# Patient Record
Sex: MALE | Race: WHITE | NOT HISPANIC OR LATINO
[De-identification: names, ages, dates, MRNs, and addresses within clinical notes are randomized per-mention and may not be internally consistent; named-entity substitution may affect disease eponyms.]

---

## 2017-02-08 ENCOUNTER — APPOINTMENT (OUTPATIENT)
Dept: ELECTROPHYSIOLOGY | Facility: CLINIC | Age: 72
End: 2017-02-08

## 2017-02-08 ENCOUNTER — NON-APPOINTMENT (OUTPATIENT)
Age: 72
End: 2017-02-08

## 2017-02-08 ENCOUNTER — APPOINTMENT (OUTPATIENT)
Dept: CARDIOLOGY | Facility: CLINIC | Age: 72
End: 2017-02-08

## 2017-02-08 VITALS
WEIGHT: 198 LBS | SYSTOLIC BLOOD PRESSURE: 147 MMHG | HEART RATE: 83 BPM | DIASTOLIC BLOOD PRESSURE: 84 MMHG | RESPIRATION RATE: 12 BRPM | HEIGHT: 69 IN | OXYGEN SATURATION: 97 % | BODY MASS INDEX: 29.33 KG/M2

## 2017-02-08 VITALS — OXYGEN SATURATION: 98 % | SYSTOLIC BLOOD PRESSURE: 130 MMHG | HEART RATE: 64 BPM | DIASTOLIC BLOOD PRESSURE: 79 MMHG

## 2017-02-08 VITALS — SYSTOLIC BLOOD PRESSURE: 152 MMHG | HEART RATE: 64 BPM | DIASTOLIC BLOOD PRESSURE: 89 MMHG | OXYGEN SATURATION: 97 %

## 2017-02-08 VITALS — HEART RATE: 63 BPM | DIASTOLIC BLOOD PRESSURE: 84 MMHG | OXYGEN SATURATION: 98 % | SYSTOLIC BLOOD PRESSURE: 139 MMHG

## 2017-02-08 VITALS — DIASTOLIC BLOOD PRESSURE: 84 MMHG | SYSTOLIC BLOOD PRESSURE: 149 MMHG | HEART RATE: 73 BPM | OXYGEN SATURATION: 98 %

## 2017-02-10 LAB
25(OH)D3 SERPL-MCNC: 33.7 NG/ML
ALBUMIN SERPL ELPH-MCNC: 4 G/DL
ALP BLD-CCNC: 113 U/L
ALT SERPL-CCNC: 12 U/L
ANION GAP SERPL CALC-SCNC: 17 MMOL/L
APPEARANCE: CLEAR
AST SERPL-CCNC: 15 U/L
BACTERIA: NEGATIVE
BASOPHILS # BLD AUTO: 0.02 K/UL
BASOPHILS NFR BLD AUTO: 0.4 %
BILIRUB SERPL-MCNC: 1 MG/DL
BILIRUBIN URINE: NEGATIVE
BLOOD URINE: NEGATIVE
BUN SERPL-MCNC: 19 MG/DL
CALCIUM SERPL-MCNC: 9.5 MG/DL
CHLORIDE SERPL-SCNC: 101 MMOL/L
CHOLEST SERPL-MCNC: 177 MG/DL
CHOLEST/HDLC SERPL: 4.5 RATIO
CK SERPL-CCNC: 77 U/L
CO2 SERPL-SCNC: 23 MMOL/L
COLOR: YELLOW
CREAT SERPL-MCNC: 0.92 MG/DL
CREAT SPEC-SCNC: 40 MG/DL
EOSINOPHIL # BLD AUTO: 0.1 K/UL
EOSINOPHIL NFR BLD AUTO: 1.8 %
GLUCOSE QUALITATIVE U: NORMAL MG/DL
GLUCOSE SERPL-MCNC: 55 MG/DL
HBA1C MFR BLD HPLC: 5.5 %
HCT VFR BLD CALC: 48.5 %
HDLC SERPL-MCNC: 39 MG/DL
HGB BLD-MCNC: 16.3 G/DL
IMM GRANULOCYTES NFR BLD AUTO: 0.2 %
INR PPP: 1.9 RATIO
KETONES URINE: NEGATIVE
LDLC SERPL CALC-MCNC: 113 MG/DL
LEUKOCYTE ESTERASE URINE: NEGATIVE
LYMPHOCYTES # BLD AUTO: 1.61 K/UL
LYMPHOCYTES NFR BLD AUTO: 29.5 %
MAGNESIUM SERPL-MCNC: 2 MG/DL
MAN DIFF?: NORMAL
MCHC RBC-ENTMCNC: 30.2 PG
MCHC RBC-ENTMCNC: 33.6 GM/DL
MCV RBC AUTO: 89.8 FL
MICROALBUMIN 24H UR DL<=1MG/L-MCNC: 1.2 MG/DL
MICROALBUMIN/CREAT 24H UR-RTO: 30 UG/MG
MICROSCOPIC-UA: NORMAL
MONOCYTES # BLD AUTO: 0.33 K/UL
MONOCYTES NFR BLD AUTO: 6 %
NEUTROPHILS # BLD AUTO: 3.39 K/UL
NEUTROPHILS NFR BLD AUTO: 62.1 %
NITRITE URINE: NEGATIVE
NT-PROBNP SERPL-MCNC: 1273 PG/ML
PH URINE: 5.5
PLATELET # BLD AUTO: 168 K/UL
POTASSIUM SERPL-SCNC: 3.8 MMOL/L
PROT SERPL-MCNC: 7.2 G/DL
PROTEIN URINE: NEGATIVE MG/DL
PT BLD: 21.6 SEC
RBC # BLD: 5.4 M/UL
RBC # FLD: 13.9 %
RED BLOOD CELLS URINE: 0 /HPF
SODIUM SERPL-SCNC: 141 MMOL/L
SPECIFIC GRAVITY URINE: 1.01
SQUAMOUS EPITHELIAL CELLS: 0 /HPF
T4 FREE SERPL-MCNC: 1.2 NG/DL
TRIGL SERPL-MCNC: 124 MG/DL
UROBILINOGEN URINE: NORMAL MG/DL
WBC # FLD AUTO: 5.46 K/UL
WHITE BLOOD CELLS URINE: 0 /HPF

## 2017-05-10 ENCOUNTER — APPOINTMENT (OUTPATIENT)
Dept: ELECTROPHYSIOLOGY | Facility: CLINIC | Age: 72
End: 2017-05-10

## 2017-08-11 ENCOUNTER — APPOINTMENT (OUTPATIENT)
Dept: CARDIOLOGY | Facility: CLINIC | Age: 72
End: 2017-08-11
Payer: MEDICARE

## 2017-08-11 ENCOUNTER — NON-APPOINTMENT (OUTPATIENT)
Age: 72
End: 2017-08-11

## 2017-08-11 VITALS
HEART RATE: 64 BPM | WEIGHT: 194 LBS | DIASTOLIC BLOOD PRESSURE: 83 MMHG | HEIGHT: 67 IN | OXYGEN SATURATION: 100 % | RESPIRATION RATE: 12 BRPM | SYSTOLIC BLOOD PRESSURE: 141 MMHG | BODY MASS INDEX: 30.45 KG/M2

## 2017-08-11 VITALS — HEART RATE: 62 BPM | SYSTOLIC BLOOD PRESSURE: 149 MMHG | DIASTOLIC BLOOD PRESSURE: 83 MMHG | OXYGEN SATURATION: 100 %

## 2017-08-11 VITALS — OXYGEN SATURATION: 100 % | HEART RATE: 63 BPM | SYSTOLIC BLOOD PRESSURE: 147 MMHG | DIASTOLIC BLOOD PRESSURE: 93 MMHG

## 2017-08-11 PROCEDURE — 93000 ELECTROCARDIOGRAM COMPLETE: CPT

## 2017-08-11 PROCEDURE — 99215 OFFICE O/P EST HI 40 MIN: CPT

## 2017-08-11 RX ORDER — CLOTRIMAZOLE 10 MG/1
10 LOZENGE ORAL
Qty: 30 | Refills: 0 | Status: COMPLETED | COMMUNITY
Start: 2016-10-19

## 2017-08-11 RX ORDER — NEOMYCIN SULFATE, POLYMYXIN B SULFATE, HYDROCORTISONE 3.5; 10000; 1 MG/ML; [USP'U]/ML; MG/ML
1 SOLUTION/ DROPS AURICULAR (OTIC)
Qty: 10 | Refills: 0 | Status: COMPLETED | COMMUNITY
Start: 2016-12-23

## 2017-08-11 RX ORDER — WARFARIN 2 MG/1
2 TABLET ORAL
Qty: 30 | Refills: 0 | Status: DISCONTINUED | COMMUNITY
Start: 2016-09-15 | End: 2017-08-11

## 2017-08-11 RX ORDER — AMOXICILLIN AND CLAVULANATE POTASSIUM 875; 125 MG/1; MG/1
875-125 TABLET, COATED ORAL
Qty: 10 | Refills: 0 | Status: COMPLETED | COMMUNITY
Start: 2016-10-04

## 2017-08-11 RX ORDER — SODIUM SULFATE, POTASSIUM SULFATE, MAGNESIUM SULFATE 17.5; 3.13; 1.6 G/ML; G/ML; G/ML
17.5-3.13-1.6 SOLUTION, CONCENTRATE ORAL
Qty: 354 | Refills: 0 | Status: COMPLETED | COMMUNITY
Start: 2016-08-11

## 2017-08-11 RX ORDER — DOXYCYCLINE 100 MG/1
100 CAPSULE ORAL
Qty: 10 | Refills: 0 | Status: COMPLETED | COMMUNITY
Start: 2016-10-04

## 2017-08-11 RX ORDER — WARFARIN 2.5 MG/1
2.5 TABLET ORAL
Qty: 30 | Refills: 0 | Status: DISCONTINUED | COMMUNITY
Start: 2016-09-15 | End: 2017-08-11

## 2017-08-11 RX ORDER — MECLIZINE HYDROCHLORIDE 25 MG/1
25 TABLET ORAL
Qty: 1 | Refills: 0 | Status: ACTIVE | COMMUNITY
Start: 2017-04-11

## 2017-08-11 RX ORDER — HYDROCORTISONE 2.5% 25 MG/G
2.5 CREAM TOPICAL
Qty: 30 | Refills: 0 | Status: COMPLETED | COMMUNITY
Start: 2016-05-12

## 2017-08-14 ENCOUNTER — APPOINTMENT (OUTPATIENT)
Dept: ELECTROPHYSIOLOGY | Facility: CLINIC | Age: 72
End: 2017-08-14

## 2017-08-14 LAB
25(OH)D3 SERPL-MCNC: 38.6 NG/ML
ALBUMIN SERPL ELPH-MCNC: 4.3 G/DL
ALP BLD-CCNC: 117 U/L
ALT SERPL-CCNC: 19 U/L
ANION GAP SERPL CALC-SCNC: 17 MMOL/L
APPEARANCE: CLEAR
AST SERPL-CCNC: 21 U/L
BACTERIA: NEGATIVE
BASOPHILS # BLD AUTO: 0.03 K/UL
BASOPHILS NFR BLD AUTO: 0.6 %
BILIRUB SERPL-MCNC: 1.3 MG/DL
BILIRUBIN URINE: NEGATIVE
BLOOD URINE: NEGATIVE
BUN SERPL-MCNC: 14 MG/DL
CALCIUM SERPL-MCNC: 9.2 MG/DL
CHLORIDE SERPL-SCNC: 102 MMOL/L
CHOLEST SERPL-MCNC: 163 MG/DL
CHOLEST/HDLC SERPL: 4.1 RATIO
CO2 SERPL-SCNC: 24 MMOL/L
COLOR: YELLOW
CREAT SERPL-MCNC: 0.94 MG/DL
CREAT SPEC-SCNC: 40 MG/DL
EOSINOPHIL # BLD AUTO: 0.12 K/UL
EOSINOPHIL NFR BLD AUTO: 2.5 %
GLUCOSE QUALITATIVE U: NORMAL MG/DL
GLUCOSE SERPL-MCNC: 78 MG/DL
HBA1C MFR BLD HPLC: 5.4 %
HCT VFR BLD CALC: 48.8 %
HDLC SERPL-MCNC: 40 MG/DL
HGB BLD-MCNC: 16.2 G/DL
IMM GRANULOCYTES NFR BLD AUTO: 0 %
INR PPP: 2.48 RATIO
KETONES URINE: NEGATIVE
LDLC SERPL CALC-MCNC: 102 MG/DL
LEUKOCYTE ESTERASE URINE: NEGATIVE
LYMPHOCYTES # BLD AUTO: 1.67 K/UL
LYMPHOCYTES NFR BLD AUTO: 35.1 %
MAGNESIUM SERPL-MCNC: 2.3 MG/DL
MAN DIFF?: NORMAL
MCHC RBC-ENTMCNC: 30.3 PG
MCHC RBC-ENTMCNC: 33.2 GM/DL
MCV RBC AUTO: 91.2 FL
MICROALBUMIN 24H UR DL<=1MG/L-MCNC: 1.1 MG/DL
MICROALBUMIN/CREAT 24H UR-RTO: 28 MG/G
MICROSCOPIC-UA: NORMAL
MONOCYTES # BLD AUTO: 0.33 K/UL
MONOCYTES NFR BLD AUTO: 6.9 %
NEUTROPHILS # BLD AUTO: 2.61 K/UL
NEUTROPHILS NFR BLD AUTO: 54.9 %
NITRITE URINE: NEGATIVE
NT-PROBNP SERPL-MCNC: 1001 PG/ML
OSMOLALITY SERPL: 292 MOS/KG
PH URINE: 7.5
PLATELET # BLD AUTO: 145 K/UL
POTASSIUM SERPL-SCNC: 4.5 MMOL/L
PROT SERPL-MCNC: 7.5 G/DL
PROTEIN URINE: NEGATIVE MG/DL
PT BLD: 28.5 SEC
RBC # BLD: 5.35 M/UL
RBC # FLD: 14 %
RED BLOOD CELLS URINE: 1 /HPF
SODIUM SERPL-SCNC: 143 MMOL/L
SPECIFIC GRAVITY URINE: 1.01
SQUAMOUS EPITHELIAL CELLS: 0 /HPF
TRIGL SERPL-MCNC: 107 MG/DL
UROBILINOGEN URINE: NORMAL MG/DL
WBC # FLD AUTO: 4.76 K/UL
WHITE BLOOD CELLS URINE: 0 /HPF

## 2017-11-20 ENCOUNTER — APPOINTMENT (OUTPATIENT)
Dept: ELECTROPHYSIOLOGY | Facility: CLINIC | Age: 72
End: 2017-11-20

## 2018-02-28 ENCOUNTER — APPOINTMENT (OUTPATIENT)
Dept: ELECTROPHYSIOLOGY | Facility: CLINIC | Age: 73
End: 2018-02-28
Payer: MEDICARE

## 2018-02-28 ENCOUNTER — APPOINTMENT (OUTPATIENT)
Dept: CARDIOLOGY | Facility: CLINIC | Age: 73
End: 2018-02-28
Payer: MEDICARE

## 2018-02-28 VITALS
HEIGHT: 67 IN | SYSTOLIC BLOOD PRESSURE: 115 MMHG | BODY MASS INDEX: 29.82 KG/M2 | DIASTOLIC BLOOD PRESSURE: 69 MMHG | OXYGEN SATURATION: 99 % | HEART RATE: 73 BPM | RESPIRATION RATE: 12 BRPM | WEIGHT: 190 LBS

## 2018-02-28 VITALS — HEART RATE: 60 BPM | SYSTOLIC BLOOD PRESSURE: 130 MMHG | DIASTOLIC BLOOD PRESSURE: 74 MMHG

## 2018-02-28 PROCEDURE — 36415 COLL VENOUS BLD VENIPUNCTURE: CPT

## 2018-02-28 PROCEDURE — 93000 ELECTROCARDIOGRAM COMPLETE: CPT

## 2018-02-28 PROCEDURE — 99215 OFFICE O/P EST HI 40 MIN: CPT

## 2018-02-28 PROCEDURE — 93040 RHYTHM ECG WITH REPORT: CPT | Mod: 59

## 2018-02-28 PROCEDURE — 93279 PRGRMG DEV EVAL PM/LDLS PM: CPT

## 2018-03-01 LAB
25(OH)D3 SERPL-MCNC: 40.7 NG/ML
ALBUMIN SERPL ELPH-MCNC: 3.9 G/DL
ALP BLD-CCNC: 114 U/L
ALT SERPL-CCNC: 22 U/L
ANION GAP SERPL CALC-SCNC: 24 MMOL/L
APPEARANCE: CLEAR
AST SERPL-CCNC: 30 U/L
BACTERIA: NEGATIVE
BASOPHILS # BLD AUTO: 0.04 K/UL
BASOPHILS NFR BLD AUTO: 0.8 %
BILIRUB SERPL-MCNC: 1.1 MG/DL
BILIRUBIN URINE: NEGATIVE
BLOOD URINE: NEGATIVE
BUN SERPL-MCNC: 20 MG/DL
CALCIUM SERPL-MCNC: 9.7 MG/DL
CHLORIDE SERPL-SCNC: 102 MMOL/L
CHOLEST SERPL-MCNC: 160 MG/DL
CHOLEST/HDLC SERPL: 3.4 RATIO
CO2 SERPL-SCNC: 19 MMOL/L
COLOR: YELLOW
CREAT SERPL-MCNC: 1.15 MG/DL
CREAT SPEC-SCNC: 58 MG/DL
EOSINOPHIL # BLD AUTO: 0.16 K/UL
EOSINOPHIL NFR BLD AUTO: 3.3 %
GLUCOSE QUALITATIVE U: NEGATIVE MG/DL
GLUCOSE SERPL-MCNC: 52 MG/DL
HBA1C MFR BLD HPLC: 5.6 %
HCT VFR BLD CALC: 48.3 %
HDLC SERPL-MCNC: 47 MG/DL
HGB BLD-MCNC: 16 G/DL
IMM GRANULOCYTES NFR BLD AUTO: 0.2 %
INR PPP: 2.52 RATIO
KETONES URINE: NEGATIVE
LDLC SERPL CALC-MCNC: 98 MG/DL
LEUKOCYTE ESTERASE URINE: NEGATIVE
LYMPHOCYTES # BLD AUTO: 1.55 K/UL
LYMPHOCYTES NFR BLD AUTO: 32 %
MAGNESIUM SERPL-MCNC: 2.2 MG/DL
MAN DIFF?: NORMAL
MCHC RBC-ENTMCNC: 30.7 PG
MCHC RBC-ENTMCNC: 33.1 GM/DL
MCV RBC AUTO: 92.7 FL
MICROALBUMIN 24H UR DL<=1MG/L-MCNC: 0.4 MG/DL
MICROALBUMIN/CREAT 24H UR-RTO: 7 MG/G
MICROSCOPIC-UA: NORMAL
MONOCYTES # BLD AUTO: 0.26 K/UL
MONOCYTES NFR BLD AUTO: 5.4 %
NEUTROPHILS # BLD AUTO: 2.83 K/UL
NEUTROPHILS NFR BLD AUTO: 58.3 %
NITRITE URINE: NEGATIVE
NT-PROBNP SERPL-MCNC: 1439 PG/ML
PH URINE: 7
PLATELET # BLD AUTO: 153 K/UL
POTASSIUM SERPL-SCNC: 4.3 MMOL/L
PROT SERPL-MCNC: 7.3 G/DL
PROTEIN URINE: NEGATIVE MG/DL
PT BLD: 29 SEC
RBC # BLD: 5.21 M/UL
RBC # FLD: 14.6 %
RED BLOOD CELLS URINE: 1 /HPF
SODIUM SERPL-SCNC: 145 MMOL/L
SPECIFIC GRAVITY URINE: 1.01
SQUAMOUS EPITHELIAL CELLS: 0 /HPF
T4 FREE SERPL-MCNC: 1.3 NG/DL
TRIGL SERPL-MCNC: 75 MG/DL
TSH SERPL-ACNC: 1.75 UIU/ML
UROBILINOGEN URINE: 1 MG/DL
WBC # FLD AUTO: 4.85 K/UL
WHITE BLOOD CELLS URINE: 0 /HPF

## 2018-06-04 ENCOUNTER — APPOINTMENT (OUTPATIENT)
Dept: ELECTROPHYSIOLOGY | Facility: CLINIC | Age: 73
End: 2018-06-04

## 2018-06-05 ENCOUNTER — APPOINTMENT (OUTPATIENT)
Dept: ELECTROPHYSIOLOGY | Facility: CLINIC | Age: 73
End: 2018-06-05
Payer: MEDICARE

## 2018-06-05 PROCEDURE — 93279 PRGRMG DEV EVAL PM/LDLS PM: CPT

## 2018-08-03 ENCOUNTER — RX RENEWAL (OUTPATIENT)
Age: 73
End: 2018-08-03

## 2018-09-03 ENCOUNTER — RESULT CHARGE (OUTPATIENT)
Age: 73
End: 2018-09-03

## 2018-09-05 ENCOUNTER — APPOINTMENT (OUTPATIENT)
Dept: CARDIOLOGY | Facility: CLINIC | Age: 73
End: 2018-09-05
Payer: MEDICARE

## 2018-09-05 ENCOUNTER — APPOINTMENT (OUTPATIENT)
Dept: ELECTROPHYSIOLOGY | Facility: CLINIC | Age: 73
End: 2018-09-05
Payer: MEDICARE

## 2018-09-05 ENCOUNTER — NON-APPOINTMENT (OUTPATIENT)
Age: 73
End: 2018-09-05

## 2018-09-05 VITALS
WEIGHT: 187 LBS | HEART RATE: 64 BPM | OXYGEN SATURATION: 98 % | BODY MASS INDEX: 29.35 KG/M2 | SYSTOLIC BLOOD PRESSURE: 130 MMHG | HEIGHT: 67 IN | RESPIRATION RATE: 12 BRPM | DIASTOLIC BLOOD PRESSURE: 75 MMHG

## 2018-09-05 VITALS — HEART RATE: 63 BPM | DIASTOLIC BLOOD PRESSURE: 77 MMHG | SYSTOLIC BLOOD PRESSURE: 130 MMHG | OXYGEN SATURATION: 98 %

## 2018-09-05 VITALS — HEART RATE: 62 BPM | SYSTOLIC BLOOD PRESSURE: 129 MMHG | DIASTOLIC BLOOD PRESSURE: 71 MMHG | OXYGEN SATURATION: 98 %

## 2018-09-05 VITALS — DIASTOLIC BLOOD PRESSURE: 73 MMHG | SYSTOLIC BLOOD PRESSURE: 120 MMHG | OXYGEN SATURATION: 98 % | HEART RATE: 62 BPM

## 2018-09-05 PROCEDURE — 93040 RHYTHM ECG WITH REPORT: CPT | Mod: 59

## 2018-09-05 PROCEDURE — 93000 ELECTROCARDIOGRAM COMPLETE: CPT

## 2018-09-05 PROCEDURE — 99215 OFFICE O/P EST HI 40 MIN: CPT

## 2018-09-05 PROCEDURE — 93279 PRGRMG DEV EVAL PM/LDLS PM: CPT

## 2018-09-05 RX ORDER — WARFARIN 5 MG/1
5 TABLET ORAL
Qty: 90 | Refills: 3 | Status: DISCONTINUED | COMMUNITY
Start: 2017-08-11 | End: 2018-09-05

## 2018-10-05 ENCOUNTER — APPOINTMENT (OUTPATIENT)
Dept: ELECTROPHYSIOLOGY | Facility: CLINIC | Age: 73
End: 2018-10-05
Payer: MEDICARE

## 2018-10-05 VITALS — OXYGEN SATURATION: 97 % | WEIGHT: 187 LBS | BODY MASS INDEX: 29.35 KG/M2 | HEIGHT: 67 IN

## 2018-10-05 VITALS — HEART RATE: 60 BPM | SYSTOLIC BLOOD PRESSURE: 141 MMHG | RESPIRATION RATE: 14 BRPM | DIASTOLIC BLOOD PRESSURE: 81 MMHG

## 2018-10-05 PROCEDURE — 93279 PRGRMG DEV EVAL PM/LDLS PM: CPT

## 2018-10-05 PROCEDURE — 93000 ELECTROCARDIOGRAM COMPLETE: CPT | Mod: 59

## 2018-10-05 PROCEDURE — 99214 OFFICE O/P EST MOD 30 MIN: CPT

## 2018-10-05 RX ORDER — GABAPENTIN 300 MG/1
300 CAPSULE ORAL
Qty: 30 | Refills: 0 | Status: DISCONTINUED | COMMUNITY
Start: 2016-12-15 | End: 2018-10-05

## 2018-10-05 RX ORDER — BENZONATATE 200 MG/1
200 CAPSULE ORAL
Qty: 21 | Refills: 0 | Status: DISCONTINUED | COMMUNITY
Start: 2016-12-23 | End: 2018-10-05

## 2018-10-05 RX ORDER — ICOSAPENT ETHYL 1000 MG/1
1 CAPSULE ORAL
Qty: 60 | Refills: 0 | Status: DISCONTINUED | COMMUNITY
Start: 2016-09-15 | End: 2018-10-05

## 2018-10-06 ENCOUNTER — NON-APPOINTMENT (OUTPATIENT)
Age: 73
End: 2018-10-06

## 2018-10-24 ENCOUNTER — APPOINTMENT (OUTPATIENT)
Dept: CV DIAGNOSITCS | Facility: HOSPITAL | Age: 73
End: 2018-10-24

## 2018-11-06 ENCOUNTER — APPOINTMENT (OUTPATIENT)
Dept: ELECTROPHYSIOLOGY | Facility: CLINIC | Age: 73
End: 2018-11-06

## 2018-11-14 ENCOUNTER — APPOINTMENT (OUTPATIENT)
Dept: ELECTROPHYSIOLOGY | Facility: CLINIC | Age: 73
End: 2018-11-14

## 2018-11-15 ENCOUNTER — APPOINTMENT (OUTPATIENT)
Dept: CV DIAGNOSITCS | Facility: HOSPITAL | Age: 73
End: 2018-11-15
Payer: MEDICARE

## 2018-11-15 ENCOUNTER — OUTPATIENT (OUTPATIENT)
Dept: OUTPATIENT SERVICES | Facility: HOSPITAL | Age: 73
LOS: 1 days | End: 2018-11-15

## 2018-11-15 DIAGNOSIS — R53.83 OTHER FATIGUE: ICD-10-CM

## 2018-11-15 DIAGNOSIS — I48.1 PERSISTENT ATRIAL FIBRILLATION: ICD-10-CM

## 2018-11-15 PROCEDURE — 93306 TTE W/DOPPLER COMPLETE: CPT | Mod: 26

## 2018-12-14 ENCOUNTER — APPOINTMENT (OUTPATIENT)
Dept: ELECTROPHYSIOLOGY | Facility: CLINIC | Age: 73
End: 2018-12-14
Payer: MEDICARE

## 2018-12-14 PROCEDURE — 93288 INTERROG EVL PM/LDLS PM IP: CPT

## 2019-02-05 ENCOUNTER — APPOINTMENT (OUTPATIENT)
Dept: ELECTROPHYSIOLOGY | Facility: CLINIC | Age: 74
End: 2019-02-05
Payer: MEDICARE

## 2019-02-05 VITALS
OXYGEN SATURATION: 98 % | BODY MASS INDEX: 29.82 KG/M2 | WEIGHT: 190 LBS | HEART RATE: 65 BPM | SYSTOLIC BLOOD PRESSURE: 150 MMHG | DIASTOLIC BLOOD PRESSURE: 87 MMHG | HEIGHT: 67 IN

## 2019-02-05 PROCEDURE — 93279 PRGRMG DEV EVAL PM/LDLS PM: CPT

## 2019-02-05 PROCEDURE — 99215 OFFICE O/P EST HI 40 MIN: CPT

## 2019-02-05 PROCEDURE — 93000 ELECTROCARDIOGRAM COMPLETE: CPT | Mod: 59

## 2019-02-05 NOTE — DISCUSSION/SUMMARY
[FreeTextEntry1] : In summary, Remi Mccoy is a 72y/o man with Hx of HTN, HLD, BPH, vertigo, permanent atrial fibrillation, maintained on Eliquis 5mg BID, and sick sinus syndrome s/p single chamber PPM who presents today for routine f/u as device has reached RYAN. Admits doing well but does note feeling increased fatigue. Exercises as tolerated. Denies chest pain, palpitations, SOB, syncope or near syncope. Device reached RYAN on 1/11/2019. Recent Echo 11/2018 with LVEF 63%. Recommend undergoing routine pacemaker gen change. Risks, benefits, and alternatives to procedure discussed and patient agrees to proceed. \par \par Sincerely,\par \par Vin Pierce MD

## 2019-02-05 NOTE — HISTORY OF PRESENT ILLNESS
[FreeTextEntry1] : Navjot Mccollum MD\par \par I saw Remi Mccoy on February 5, 2019. As you know, he is a 74y/o man with Hx of HTN, HLD, BPH, vertigo, permanent atrial fibrillation, maintained on Eliquis 5mg BID, and sick sinus syndrome s/p single chamber PPM who presents today for routine f/u as device has reached RYAN. Admits doing well but does note feeling increased fatigue. Exercises as tolerated. Denies chest pain, palpitations, SOB, syncope or near syncope. \par

## 2019-02-05 NOTE — PHYSICAL EXAM
[General Appearance - Well Developed] : well developed [Normal Appearance] : normal appearance [Well Groomed] : well groomed [General Appearance - Well Nourished] : well nourished [No Deformities] : no deformities [General Appearance - In No Acute Distress] : no acute distress [Normal Conjunctiva] : the conjunctiva exhibited no abnormalities [Eyelids - No Xanthelasma] : the eyelids demonstrated no xanthelasmas [Normal Oral Mucosa] : normal oral mucosa [No Oral Pallor] : no oral pallor [No Oral Cyanosis] : no oral cyanosis [Normal Jugular Venous A Waves Present] : normal jugular venous A waves present [Normal Jugular Venous V Waves Present] : normal jugular venous V waves present [No Jugular Venous Burgos A Waves] : no jugular venous burgos A waves [Respiration, Rhythm And Depth] : normal respiratory rhythm and effort [Exaggerated Use Of Accessory Muscles For Inspiration] : no accessory muscle use [Auscultation Breath Sounds / Voice Sounds] : lungs were clear to auscultation bilaterally [Heart Rate And Rhythm] : heart rate and rhythm were normal [Heart Sounds] : normal S1 and S2 [Murmurs] : no murmurs present [Abdomen Soft] : soft [Abdomen Tenderness] : non-tender [Abdomen Mass (___ Cm)] : no abdominal mass palpated [Abnormal Walk] : normal gait [Gait - Sufficient For Exercise Testing] : the gait was sufficient for exercise testing [Nail Clubbing] : no clubbing of the fingernails [Cyanosis, Localized] : no localized cyanosis [Petechial Hemorrhages (___cm)] : no petechial hemorrhages [Skin Color & Pigmentation] : normal skin color and pigmentation [] : no rash [No Venous Stasis] : no venous stasis [Skin Lesions] : no skin lesions [No Skin Ulcers] : no skin ulcer [No Xanthoma] : no  xanthoma was observed [Oriented To Time, Place, And Person] : oriented to person, place, and time [Affect] : the affect was normal [Mood] : the mood was normal [No Anxiety] : not feeling anxious

## 2019-03-05 ENCOUNTER — RESULT REVIEW (OUTPATIENT)
Age: 74
End: 2019-03-05

## 2019-03-06 ENCOUNTER — NON-APPOINTMENT (OUTPATIENT)
Age: 74
End: 2019-03-06

## 2019-03-06 ENCOUNTER — APPOINTMENT (OUTPATIENT)
Dept: CARDIOLOGY | Facility: CLINIC | Age: 74
End: 2019-03-06
Payer: MEDICARE

## 2019-03-06 VITALS
OXYGEN SATURATION: 98 % | HEIGHT: 67 IN | DIASTOLIC BLOOD PRESSURE: 83 MMHG | WEIGHT: 178 LBS | SYSTOLIC BLOOD PRESSURE: 148 MMHG | BODY MASS INDEX: 27.94 KG/M2 | HEART RATE: 65 BPM | RESPIRATION RATE: 12 BRPM

## 2019-03-06 VITALS — HEART RATE: 65 BPM | DIASTOLIC BLOOD PRESSURE: 85 MMHG | OXYGEN SATURATION: 98 % | SYSTOLIC BLOOD PRESSURE: 152 MMHG

## 2019-03-06 DIAGNOSIS — I48.2 CHRONIC ATRIAL FIBRILLATION: ICD-10-CM

## 2019-03-06 DIAGNOSIS — I48.91 UNSPECIFIED ATRIAL FIBRILLATION: ICD-10-CM

## 2019-03-06 DIAGNOSIS — K80.62: ICD-10-CM

## 2019-03-06 DIAGNOSIS — R53.83 OTHER FATIGUE: ICD-10-CM

## 2019-03-06 PROCEDURE — 99215 OFFICE O/P EST HI 40 MIN: CPT | Mod: 25

## 2019-03-06 PROCEDURE — 93000 ELECTROCARDIOGRAM COMPLETE: CPT

## 2019-03-06 PROCEDURE — 93040 RHYTHM ECG WITH REPORT: CPT | Mod: 59

## 2019-03-06 NOTE — REASON FOR VISIT
[Follow-Up - Clinic] : a clinic follow-up of [Anticoagulation] : anticoagulation [Atrial Fibrillation] : atrial fibrillation [Cardiomyopathy] : cardiomyopathy [Chest Pain] : chest pain [Coronary Artery Disease] : coronary artery disease [Hyperlipidemia] : hyperlipidemia [Pacemaker Evaluation] : pacemaker ~T evaluation ~C was performed

## 2019-03-07 PROBLEM — K80.62 CALCULUS OF GALLBLADDER AND BILE DUCT WITH ACUTE CHOLECYSTITIS WITHOUT OBSTRUCTION: Status: ACTIVE | Noted: 2019-03-07

## 2019-03-07 LAB
25(OH)D3 SERPL-MCNC: 33.5 NG/ML
ALBUMIN SERPL ELPH-MCNC: 4.1 G/DL
ALP BLD-CCNC: 101 U/L
ALT SERPL-CCNC: 22 U/L
ANION GAP SERPL CALC-SCNC: 16 MMOL/L
APPEARANCE: ABNORMAL
AST SERPL-CCNC: 22 U/L
BACTERIA: NEGATIVE
BASOPHILS # BLD AUTO: 0.05 K/UL
BASOPHILS NFR BLD AUTO: 0.9 %
BILIRUB SERPL-MCNC: 0.9 MG/DL
BILIRUBIN URINE: NEGATIVE
BLOOD URINE: NEGATIVE
BUN SERPL-MCNC: 18 MG/DL
CALCIUM OXALATE CRYSTALS: ABNORMAL
CALCIUM SERPL-MCNC: 9.7 MG/DL
CHLORIDE SERPL-SCNC: 102 MMOL/L
CHOLEST SERPL-MCNC: 145 MG/DL
CHOLEST/HDLC SERPL: 3.7 RATIO
CO2 SERPL-SCNC: 26 MMOL/L
COLOR: YELLOW
CREAT SERPL-MCNC: 0.98 MG/DL
CREAT SPEC-SCNC: 209 MG/DL
CRP SERPL HS-MCNC: 2.39 MG/L
EOSINOPHIL # BLD AUTO: 0.18 K/UL
EOSINOPHIL NFR BLD AUTO: 3.3 %
GLUCOSE QUALITATIVE U: NEGATIVE
GLUCOSE SERPL-MCNC: 48 MG/DL
GRANULAR CASTS: 0 /LPF
HBA1C MFR BLD HPLC: 5.4 %
HCT VFR BLD CALC: 52.4 %
HDLC SERPL-MCNC: 39 MG/DL
HGB BLD-MCNC: 17.1 G/DL
HYALINE CASTS: 0 /LPF
IMM GRANULOCYTES NFR BLD AUTO: 0.2 %
KETONES URINE: NEGATIVE
LDLC SERPL CALC-MCNC: 94 MG/DL
LEUKOCYTE ESTERASE URINE: NEGATIVE
LYMPHOCYTES # BLD AUTO: 1.83 K/UL
LYMPHOCYTES NFR BLD AUTO: 33.2 %
MAGNESIUM SERPL-MCNC: 2.1 MG/DL
MAN DIFF?: NORMAL
MCHC RBC-ENTMCNC: 29.9 PG
MCHC RBC-ENTMCNC: 32.6 GM/DL
MCV RBC AUTO: 91.8 FL
MICROALBUMIN 24H UR DL<=1MG/L-MCNC: 5.4 MG/DL
MICROALBUMIN/CREAT 24H UR-RTO: 26 MG/G
MICROSCOPIC-UA: NORMAL
MONOCYTES # BLD AUTO: 0.34 K/UL
MONOCYTES NFR BLD AUTO: 6.2 %
NEUTROPHILS # BLD AUTO: 3.1 K/UL
NEUTROPHILS NFR BLD AUTO: 56.2 %
NITRITE URINE: NEGATIVE
PH URINE: 5.5
PLATELET # BLD AUTO: 144 K/UL
POTASSIUM SERPL-SCNC: 3.6 MMOL/L
PROT SERPL-MCNC: 7.3 G/DL
PROTEIN URINE: NORMAL
RBC # BLD: 5.71 M/UL
RBC # FLD: 13.1 %
RED BLOOD CELLS URINE: 0 /HPF
SODIUM SERPL-SCNC: 144 MMOL/L
SPECIFIC GRAVITY URINE: 1.03
SQUAMOUS EPITHELIAL CELLS: 1 /HPF
T4 FREE SERPL-MCNC: 1.3 NG/DL
TRIGL SERPL-MCNC: 59 MG/DL
TSH SERPL-ACNC: 2.32 UIU/ML
UROBILINOGEN URINE: NORMAL
WBC # FLD AUTO: 5.51 K/UL
WHITE BLOOD CELLS URINE: 1 /HPF

## 2019-03-07 NOTE — ADDENDUM
[FreeTextEntry1] : I spent 60 minutes face to face time with the patient, from 9:00 - 10:00 on 3/6/19. Thirty minutes were devoted to patient counseling as outlined above in the End of Visit section.  Prior to this visit, I review office notes of Dr. Pierce. Labs done 1/31/19 with Dr. Baxter were reviewed. TTE done 11/15/18 was reviewed

## 2019-03-07 NOTE — PHYSICAL EXAM
[General Appearance - Well Developed] : well developed [Normal Appearance] : normal appearance [Well Groomed] : well groomed [General Appearance - Well Nourished] : well nourished [No Deformities] : no deformities [General Appearance - In No Acute Distress] : no acute distress [Normal Conjunctiva] : the conjunctiva exhibited no abnormalities [Eyelids - No Xanthelasma] : the eyelids demonstrated no xanthelasmas [Normal Oral Mucosa] : normal oral mucosa [No Oral Pallor] : no oral pallor [No Oral Cyanosis] : no oral cyanosis [Normal Jugular Venous A Waves Present] : normal jugular venous A waves present [Normal Jugular Venous V Waves Present] : normal jugular venous V waves present [No Jugular Venous Burgos A Waves] : no jugular venous burgos A waves [Respiration, Rhythm And Depth] : normal respiratory rhythm and effort [Exaggerated Use Of Accessory Muscles For Inspiration] : no accessory muscle use [Auscultation Breath Sounds / Voice Sounds] : lungs were clear to auscultation bilaterally [Scattered Wheezes] : scattered wheezing [Abdomen Soft] : soft [Abdomen Tenderness] : non-tender [Abdomen Mass (___ Cm)] : no abdominal mass palpated [Abnormal Walk] : normal gait [Gait - Sufficient For Exercise Testing] : the gait was sufficient for exercise testing [Nail Clubbing] : no clubbing of the fingernails [Cyanosis, Localized] : no localized cyanosis [Petechial Hemorrhages (___cm)] : no petechial hemorrhages [Skin Color & Pigmentation] : normal skin color and pigmentation [] : no rash [No Venous Stasis] : no venous stasis [No Skin Ulcers] : no skin ulcer [No Xanthoma] : no  xanthoma was observed [Smooth] : with smooth borders [Multiple Papules] : multiple [Black] : black [Brown] : brown [Multiple] : multiple [Oriented To Time, Place, And Person] : oriented to person, place, and time [Affect] : the affect was normal [Mood] : the mood was normal [No Anxiety] : not feeling anxious [5th Left ICS - MCL] : palpated at the 5th LICS in the midclavicular line [Diffuse] : diffuse [No Precordial Heave] : no precordial heave was noted [Normal Rate] : normal [Rhythm Regular] : regular [Normal S1] : normal S1 [No Murmur] : no murmurs heard [2+] : left 2+ [No Abnormalities] : the abdominal aorta was not enlarged and no bruit was heard [No Pitting Edema] : no pitting edema present [Rt] : varicose veins of the right leg noted [Lt] : varicose veins of the left leg noted [Prolonged Exp Time] : with normal expiratory time [Increased E/I Ratio] : with normal expiratory/inspiratory ratio  [FreeTextEntry1] : ankle circumference 9.0 inches bilaterally; previously 9.25 in bilaterally [Apical Thrill] : no thrill palpable at the apex [S4] : no S4 [Click] : no click [Pericardial Rub] : no pericardial rub [Right Carotid Bruit] : no bruit heard over the right carotid [Left Carotid Bruit] : no bruit heard over the left carotid [Right Femoral Bruit] : no bruit heard over the right femoral artery [Left Femoral Bruit] : no bruit heard over the left femoral artery

## 2019-03-07 NOTE — DISCUSSION/SUMMARY
[Patient] : the patient [Minutes spent___] : for [unfilled] ~Uminutes [___ Month(s)] : [unfilled] month(s) [With Me] : with me [FreeTextEntry1] : \par WEIGHT GOALS:\par \par Category				BMI range - kg/m2	BMI Prime\par Very severely underweight		less than 15		less than 0.60	\par Severely underweight			from 15.0 to 16.0		from 0.60 to 0.64	\par Underweight				from 16.0 to 18.5		from 0.64 to 0.74	\par Normal (healthy weight)			from 18.5 to 25		from 0.74 to 1.0	\par Overweight				from 25 to 30		from 1.0 to 1.2	\par Obese Class I (Moderately obese)		from 30 to 35		from 1.2 to 1.4	\par Obese Class II (Severely obese)		from 35 to 40		from 1.4 to 1.6	\par Obese Class III (Very severely obese)	over 40	over 1.6				\par \par Your current weight is 178 lbs (190 lbs on 2/5/19; 200 lbs on 2/10/16). Given current weight and height 5'7", your calculated body mass index (BMI) is 27.9 kg/sqm. Normal BMI is 18.5-25 kg/sqm. Thus current weight is in the overweight category. Abdominal waist circumference is measured at the level of the umbilicus and is thus not a pants waist measurement. Your current abdominal waist circumference is 38 inches (40.5  on 9/5/18; 40.5 inches on 2/10/16). Normal abdominal waist circumference is < 32 inches for women and < 37 inches for men.\par \par Small amounts of alcohol may have benefits to the heart and blood pressure. However, excess use of alcohol can cause damage to the brain, liver and other organs. It can lead to high blood pressure. Drinking more than two drinks (15 ml) every day can raise your blood pressure. 15 ml of alcohol equals: \par • one 12-ounce bottle of beer \par • a half glass (5 ounces) of wine \par • 1 ounce (one shot) of 100 proof hard liquor\par \par WAYS TO INCREASE HDL-CHOLESTEROL:\par Lose weight. Extra pounds take a toll on HDL cholesterol. If you're overweight, losing even a few pounds can improve your HDL level. For every 6 pounds (2.7 kilograms) you lose, your HDL may increase by 1 mg/dL (0.03 mmol/L). If you focus on becoming more physically active and choosing healthier foods — two other ways to increase your HDL cholesterol — you'll likely move toward a healthier weight in the process.\par Get more physical activity. Within two months of starting, frequent aerobic exercise can increase HDL cholesterol by about 5 percent in otherwise healthy sedentary adults. Your best bet for increasing HDL cholesterol is to exercise briskly for 30 minutes five times a week. Examples of brisk, aerobic exercise include walking, running, cycling, swimming, playing basketball and raking leaves — anything that increases your heart rate. You can also break up your daily activity into three 10-minute segments if you're having difficulty finding time to exercise.\par Choose healthier fats. A healthy diet includes some fat, but there's a limit. In a heart-healthy diet, between 25 and 35 percent of your total daily calories can come from fat — but saturated fat should account for less than 7 percent of your total daily calories. Avoid foods that contain saturated and trans fats, which raise LDL cholesterol and damage your blood vessels.\par Monounsaturated and polyunsaturated fats — found in olive, peanut and canola oils — tend to improve HDL's anti-inflammatory abilities. Nuts, fish and other foods containing omega-3 fatty acids are other good choices for improving your LDL cholesterol to HDL cholesterol ratio.\par \par Some foods may have a healthy effect on blood cholesterol levels. Some options include:\par \par Whole grains, such as oatmeal, oat bran and whole-wheat products\par Nuts, such as walnuts, almonds and brazil nuts\par Plant sterols such as beta-sitosterol and -sitostanol (typically found in margarine spreads such as Promise Activ or Benecol)\par Omega-3 fatty acids, such as fatty fish, fish oil supplements, flaxseeds and flaxseed oil\par

## 2019-03-07 NOTE — HISTORY OF PRESENT ILLNESS
[FreeTextEntry1] : Mr. Mccoy was initially seen by me 6/26/08 with known single vessel CAD, of D1 branch of LAD, moderate diffuse LV dysfunction, no PCI.  In 2001, he underwent PPM (Medtronic LKO063 VVI mode) for atrial fibrillation with slow ventricular response and pause up to 4 seconds. He was well until 6/1/08, when he presented to the ED at [facility] with chest pain and severe hypertension (/120 mm Hg). He was treated with intravenous nitroglycerin or nitroprusside and was discharged to home after undergoing a stress test.  During office visit on 8/19/11, there was intermittent substernal chest pain (squeezing with some burning) radiating to the left shoulder. There was frequently dizziness and insomnia He stopped going to the gym and felt weak. In June 2012, he was admitted to [facility] with left shoulder pain and /110-117 mm Hg, despite taking all medication. He was treated for hypertension and discharged home after about 12 hours, with no work up for ischemia. There were subsequent symptoms recurrences. He noted significant stress at that time. In August 2012, he used doxazosin only when BP was elevated. Quinapril (Accupril) was stopped and substituted with Diovan. He was no longer taking isosorbide mononitrate (Imdur). He was taking high dose vitamin D supplement. On Thanksgiving day (11/22/12) at 16:30, he developed sudden warmth and dizziness while driving across the [place] [facility]. He opened the window to get more air and made it across the bridge. He then pulled over to the side of the road.  He began retching and vomiting, but did not lose consciousness. There was no abdominal pain or heartburn. He was admitted to [facility], where he stayed for 48 hours. After discharge, and at the time of office visit on 12/24/12, he still noted periods of dizziness and unsteadiness, but no severe nausea. He had occasional one-two ounce whiskey or cognac, but not daily. He was not smoking. At the time of office visit on 12/13/13, he was not smoking and did not have cravings. He was drinking one to two glasses wine or two ounces whisky / cognac, several days per week. He was using quinapril and not Diovan. At the time of office visit on 6/18/14, he noted continued periodic chest pain. There was no dyspnea, palpitations or lightheadedness. 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 He underwent surgical resection 7/9/14. No additional treatment was required. He lost up to twenty pounds after surgery, but subsequently regained weight. There was occasional chest pain while walking, but pain resolved with continued walking. At the time of office  visit on 2/11/15, he was recovering from upper respiratory infection. He was not routinely exercising, but did some physical activity at home.  He noted discomfort from right inguinal hernia and small midline abdominal hernia. He wore a cloth brace to the visit and noted 4/10 discomfort.  At the time of office visit on 8/12/15, his chief complaint was recurrence of mild vertigo. There was no shortness of breath or chest pain. There was no palpitations, lightheadedness or excessive fatigue. At the time of office visit on 2/10/16, his chief complaint was shortness of breath with exertion. He was in the ED 10 days prior with nasal bleeding.  Nasal packing was performed. INR was ~2.0. He was hospitalized for one week in October 2016 for pneumonia. At the time of office visit on 8/11/17, self obtained BP noted frequent SBP readings > 140 mm Hg. During office visit on 9/5/18, there were no new symptoms. He was not routinely exercising. At the time of office visit on 3/6/19, he felt well. He was scheduled for PPM generator change on 3/11/19 for battery RYAN. He was diagnosed with acute cholecystitis due to gallstones and was considering cholecystectomy. He changed diet, which helped decrease abdominal pain.  He did not take antihypertensive medication on the day of this visit.

## 2019-03-08 PROBLEM — I48.2 PERMANENT ATRIAL FIBRILLATION: Noted: 2019-02-05

## 2019-03-11 ENCOUNTER — OUTPATIENT (OUTPATIENT)
Dept: OUTPATIENT SERVICES | Facility: HOSPITAL | Age: 74
LOS: 1 days | Discharge: HOME | End: 2019-03-11
Payer: MEDICARE

## 2019-03-11 PROCEDURE — 93010 ELECTROCARDIOGRAM REPORT: CPT

## 2019-03-11 PROCEDURE — 33227 REMOVE&REPLACE PM GEN SINGL: CPT

## 2019-03-11 RX ORDER — ACETAMINOPHEN 500 MG
2 TABLET ORAL
Qty: 40 | Refills: 0
Start: 2019-03-11 | End: 2019-03-15

## 2019-03-11 RX ORDER — SODIUM CHLORIDE 9 MG/ML
3 INJECTION INTRAMUSCULAR; INTRAVENOUS; SUBCUTANEOUS EVERY 8 HOURS
Qty: 0 | Refills: 0 | Status: DISCONTINUED | OUTPATIENT
Start: 2019-03-11 | End: 2019-03-26

## 2019-03-11 NOTE — H&P CARDIOLOGY - PMH
Atrial Fibrillation    Cardiac Pacemaker    Dyslipidemia    GERD (Gastroesophageal Reflux Disease)    HTN (Hypertension)

## 2019-03-11 NOTE — CHART NOTE - NSCHARTNOTEFT_GEN_A_CORE
Type of Procedure: Single chamber PPM generator change  Licensed independent practitioner: Vin Pierce MD  Assistant: none  Description of procedure: sterile conditions, antibiotic coverage, old pacemaker removed, prepectoral pocket copiously irrigated with antibiotic solution, new pacemaker implanted and closed in 3 layers  Findings of procedure: normal pacing, sensing and lead integrity  Estimated blood loss: < 10 cc  Specimen removed: none  Preoperative Dx: AV block  Postoperative Dx: AV block  Complications: none  Anesthesia type: local with sedation  No heparin for 24 hours and then reassess.    Vin Pierce MD.

## 2019-03-11 NOTE — H&P CARDIOLOGY - HISTORY OF PRESENT ILLNESS
74 y/o M w/ PMH of HTN, HLD, BPH, vertigo, permanent atrial fibrillation on Eliquis and sick sinus syndrome s/p PPM presents for generator change 2/2 device reaching RYAN. Pt denies any symptoms at this time.

## 2019-04-03 ENCOUNTER — APPOINTMENT (OUTPATIENT)
Dept: ELECTROPHYSIOLOGY | Facility: CLINIC | Age: 74
End: 2019-04-03
Payer: MEDICARE

## 2019-04-03 PROCEDURE — 99024 POSTOP FOLLOW-UP VISIT: CPT

## 2019-05-06 ENCOUNTER — APPOINTMENT (OUTPATIENT)
Dept: SURGERY | Facility: CLINIC | Age: 74
End: 2019-05-06
Payer: MEDICARE

## 2019-05-06 VITALS
HEIGHT: 67 IN | HEART RATE: 82 BPM | BODY MASS INDEX: 28.25 KG/M2 | DIASTOLIC BLOOD PRESSURE: 73 MMHG | WEIGHT: 180 LBS | TEMPERATURE: 98.2 F | SYSTOLIC BLOOD PRESSURE: 146 MMHG

## 2019-05-06 PROCEDURE — 99203 OFFICE O/P NEW LOW 30 MIN: CPT

## 2019-07-15 ENCOUNTER — APPOINTMENT (OUTPATIENT)
Dept: ELECTROPHYSIOLOGY | Facility: CLINIC | Age: 74
End: 2019-07-15
Payer: MEDICARE

## 2019-07-15 PROCEDURE — 93296 REM INTERROG EVL PM/IDS: CPT

## 2019-07-15 PROCEDURE — 93294 REM INTERROG EVL PM/LDLS PM: CPT

## 2019-07-23 ENCOUNTER — RX RENEWAL (OUTPATIENT)
Age: 74
End: 2019-07-23

## 2019-09-13 ENCOUNTER — NON-APPOINTMENT (OUTPATIENT)
Age: 74
End: 2019-09-13

## 2019-09-13 ENCOUNTER — APPOINTMENT (OUTPATIENT)
Dept: CARDIOLOGY | Facility: CLINIC | Age: 74
End: 2019-09-13
Payer: MEDICARE

## 2019-09-13 VITALS
HEART RATE: 74 BPM | DIASTOLIC BLOOD PRESSURE: 74 MMHG | HEIGHT: 67 IN | BODY MASS INDEX: 28.25 KG/M2 | OXYGEN SATURATION: 99 % | SYSTOLIC BLOOD PRESSURE: 126 MMHG | RESPIRATION RATE: 12 BRPM | WEIGHT: 180 LBS

## 2019-09-13 VITALS — OXYGEN SATURATION: 99 % | SYSTOLIC BLOOD PRESSURE: 131 MMHG | DIASTOLIC BLOOD PRESSURE: 72 MMHG | HEART RATE: 66 BPM

## 2019-09-13 VITALS — HEART RATE: 62 BPM | OXYGEN SATURATION: 98 % | DIASTOLIC BLOOD PRESSURE: 68 MMHG | SYSTOLIC BLOOD PRESSURE: 130 MMHG

## 2019-09-13 VITALS — DIASTOLIC BLOOD PRESSURE: 67 MMHG | SYSTOLIC BLOOD PRESSURE: 125 MMHG | HEART RATE: 66 BPM | OXYGEN SATURATION: 98 %

## 2019-09-13 DIAGNOSIS — K44.9 GASTRO-ESOPHAGEAL REFLUX DISEASE W/OUT ESOPHAGITIS: ICD-10-CM

## 2019-09-13 DIAGNOSIS — K21.9 GASTRO-ESOPHAGEAL REFLUX DISEASE W/OUT ESOPHAGITIS: ICD-10-CM

## 2019-09-13 DIAGNOSIS — M79.672 PAIN IN LEFT FOOT: ICD-10-CM

## 2019-09-13 DIAGNOSIS — F10.10 ALCOHOL ABUSE, UNCOMPLICATED: ICD-10-CM

## 2019-09-13 DIAGNOSIS — M77.52 OTHER ENTHESOPATHY OF LT FOOT AND ANKLE: ICD-10-CM

## 2019-09-13 PROCEDURE — 99215 OFFICE O/P EST HI 40 MIN: CPT

## 2019-09-13 PROCEDURE — 93040 RHYTHM ECG WITH REPORT: CPT | Mod: 59

## 2019-09-13 PROCEDURE — 36415 COLL VENOUS BLD VENIPUNCTURE: CPT

## 2019-09-13 PROCEDURE — 93000 ELECTROCARDIOGRAM COMPLETE: CPT

## 2019-09-13 RX ORDER — ERGOCALCIFEROL 1.25 MG/1
1.25 MG CAPSULE, LIQUID FILLED ORAL
Qty: 4 | Refills: 0 | Status: DISCONTINUED | COMMUNITY
Start: 2016-12-07 | End: 2019-09-13

## 2019-09-16 ENCOUNTER — OUTPATIENT (OUTPATIENT)
Dept: OUTPATIENT SERVICES | Facility: HOSPITAL | Age: 74
LOS: 1 days | End: 2019-09-16

## 2019-09-16 VITALS
HEART RATE: 62 BPM | HEIGHT: 69 IN | OXYGEN SATURATION: 99 % | WEIGHT: 190.04 LBS | RESPIRATION RATE: 14 BRPM | DIASTOLIC BLOOD PRESSURE: 70 MMHG | SYSTOLIC BLOOD PRESSURE: 120 MMHG | TEMPERATURE: 97 F

## 2019-09-16 DIAGNOSIS — I10 ESSENTIAL (PRIMARY) HYPERTENSION: ICD-10-CM

## 2019-09-16 DIAGNOSIS — Z95.0 PRESENCE OF CARDIAC PACEMAKER: Chronic | ICD-10-CM

## 2019-09-16 DIAGNOSIS — K80.20 CALCULUS OF GALLBLADDER WITHOUT CHOLECYSTITIS WITHOUT OBSTRUCTION: ICD-10-CM

## 2019-09-16 DIAGNOSIS — K43.2 INCISIONAL HERNIA WITHOUT OBSTRUCTION OR GANGRENE: ICD-10-CM

## 2019-09-16 DIAGNOSIS — Z85.038 PERSONAL HISTORY OF OTHER MALIGNANT NEOPLASM OF LARGE INTESTINE: Chronic | ICD-10-CM

## 2019-09-16 DIAGNOSIS — Z95.0 PRESENCE OF CARDIAC PACEMAKER: ICD-10-CM

## 2019-09-16 DIAGNOSIS — R06.83 SNORING: ICD-10-CM

## 2019-09-16 PROBLEM — K21.9 HIATAL HERNIA WITH GERD: Status: ACTIVE | Noted: 2019-09-13

## 2019-09-16 LAB
25(OH)D3 SERPL-MCNC: 35.2 NG/ML
ALBUMIN SERPL ELPH-MCNC: 4.6 G/DL
ALP BLD-CCNC: 108 U/L
ALT SERPL-CCNC: 24 U/L
ANION GAP SERPL CALC-SCNC: 13 MMO/L — SIGNIFICANT CHANGE UP (ref 7–14)
ANION GAP SERPL CALC-SCNC: 15 MMOL/L
APPEARANCE: CLEAR
AST SERPL-CCNC: 29 U/L
BACTERIA: NEGATIVE
BASOPHILS # BLD AUTO: 0.05 K/UL — SIGNIFICANT CHANGE UP (ref 0–0.2)
BASOPHILS # BLD AUTO: 0.06 K/UL
BASOPHILS NFR BLD AUTO: 1 % — SIGNIFICANT CHANGE UP (ref 0–2)
BASOPHILS NFR BLD AUTO: 1.1 %
BILIRUB SERPL-MCNC: 1.5 MG/DL
BILIRUBIN URINE: NEGATIVE
BLD GP AB SCN SERPL QL: NEGATIVE — SIGNIFICANT CHANGE UP
BLOOD URINE: NEGATIVE
BUN SERPL-MCNC: 19 MG/DL
BUN SERPL-MCNC: 21 MG/DL — SIGNIFICANT CHANGE UP (ref 7–23)
CALCIUM SERPL-MCNC: 10 MG/DL
CALCIUM SERPL-MCNC: 9.4 MG/DL — SIGNIFICANT CHANGE UP (ref 8.4–10.5)
CHLORIDE SERPL-SCNC: 101 MMOL/L — SIGNIFICANT CHANGE UP (ref 98–107)
CHLORIDE SERPL-SCNC: 102 MMOL/L
CHOLEST SERPL-MCNC: 155 MG/DL
CHOLEST/HDLC SERPL: 3.3 RATIO
CO2 SERPL-SCNC: 24 MMOL/L
CO2 SERPL-SCNC: 27 MMOL/L — SIGNIFICANT CHANGE UP (ref 22–31)
COLOR: NORMAL
CREAT SERPL-MCNC: 0.96 MG/DL
CREAT SERPL-MCNC: 1.03 MG/DL — SIGNIFICANT CHANGE UP (ref 0.5–1.3)
CREAT SPEC-SCNC: 40 MG/DL
CRP SERPL HS-MCNC: 1.78 MG/L
EOSINOPHIL # BLD AUTO: 0.24 K/UL — SIGNIFICANT CHANGE UP (ref 0–0.5)
EOSINOPHIL # BLD AUTO: 0.29 K/UL
EOSINOPHIL NFR BLD AUTO: 4.7 % — SIGNIFICANT CHANGE UP (ref 0–6)
EOSINOPHIL NFR BLD AUTO: 5.2 %
ESTIMATED AVERAGE GLUCOSE: 111 MG/DL
GLUCOSE QUALITATIVE U: NEGATIVE
GLUCOSE SERPL-MCNC: 71 MG/DL
GLUCOSE SERPL-MCNC: 71 MG/DL — SIGNIFICANT CHANGE UP (ref 70–99)
HBA1C BLD-MCNC: 5.3 % — SIGNIFICANT CHANGE UP (ref 4–5.6)
HBA1C MFR BLD HPLC: 5.5 %
HCT VFR BLD CALC: 49.7 % — SIGNIFICANT CHANGE UP (ref 39–50)
HCT VFR BLD CALC: 51.5 %
HDLC SERPL-MCNC: 47 MG/DL
HGB BLD-MCNC: 16.2 G/DL — SIGNIFICANT CHANGE UP (ref 13–17)
HGB BLD-MCNC: 17.1 G/DL
HYALINE CASTS: 0 /LPF
IMM GRANULOCYTES NFR BLD AUTO: 0 % — SIGNIFICANT CHANGE UP (ref 0–1.5)
IMM GRANULOCYTES NFR BLD AUTO: 0.4 %
INR PPP: 1.53 RATIO
KETONES URINE: NEGATIVE
LDLC SERPL CALC-MCNC: 93 MG/DL
LEUKOCYTE ESTERASE URINE: NEGATIVE
LYMPHOCYTES # BLD AUTO: 1.08 K/UL
LYMPHOCYTES # BLD AUTO: 1.35 K/UL — SIGNIFICANT CHANGE UP (ref 1–3.3)
LYMPHOCYTES # BLD AUTO: 26.3 % — SIGNIFICANT CHANGE UP (ref 13–44)
LYMPHOCYTES NFR BLD AUTO: 19.3 %
MAGNESIUM SERPL-MCNC: 2.3 MG/DL
MAN DIFF?: NORMAL
MCHC RBC-ENTMCNC: 30.6 PG
MCHC RBC-ENTMCNC: 30.6 PG — SIGNIFICANT CHANGE UP (ref 27–34)
MCHC RBC-ENTMCNC: 32.6 % — SIGNIFICANT CHANGE UP (ref 32–36)
MCHC RBC-ENTMCNC: 33.2 GM/DL
MCV RBC AUTO: 92.1 FL
MCV RBC AUTO: 94 FL — SIGNIFICANT CHANGE UP (ref 80–100)
MICROALBUMIN 24H UR DL<=1MG/L-MCNC: <1.2 MG/DL
MICROALBUMIN/CREAT 24H UR-RTO: NORMAL MG/G
MICROSCOPIC-UA: NORMAL
MONOCYTES # BLD AUTO: 0.34 K/UL — SIGNIFICANT CHANGE UP (ref 0–0.9)
MONOCYTES # BLD AUTO: 0.41 K/UL
MONOCYTES NFR BLD AUTO: 6.6 % — SIGNIFICANT CHANGE UP (ref 2–14)
MONOCYTES NFR BLD AUTO: 7.3 %
NEUTROPHILS # BLD AUTO: 3.16 K/UL — SIGNIFICANT CHANGE UP (ref 1.8–7.4)
NEUTROPHILS # BLD AUTO: 3.74 K/UL
NEUTROPHILS NFR BLD AUTO: 61.4 % — SIGNIFICANT CHANGE UP (ref 43–77)
NEUTROPHILS NFR BLD AUTO: 66.7 %
NITRITE URINE: NEGATIVE
NRBC # FLD: 0 K/UL — SIGNIFICANT CHANGE UP (ref 0–0)
PH URINE: 6.5
PLATELET # BLD AUTO: 143 K/UL
PLATELET # BLD AUTO: 145 K/UL — LOW (ref 150–400)
PMV BLD: 11 FL — SIGNIFICANT CHANGE UP (ref 7–13)
POTASSIUM SERPL-MCNC: 3.8 MMOL/L — SIGNIFICANT CHANGE UP (ref 3.5–5.3)
POTASSIUM SERPL-SCNC: 3.8 MMOL/L — SIGNIFICANT CHANGE UP (ref 3.5–5.3)
POTASSIUM SERPL-SCNC: 3.9 MMOL/L
PROT SERPL-MCNC: 8.2 G/DL
PROTEIN URINE: NEGATIVE
PT BLD: 17.8 SEC
RBC # BLD: 5.29 M/UL — SIGNIFICANT CHANGE UP (ref 4.2–5.8)
RBC # BLD: 5.59 M/UL
RBC # FLD: 13.9 %
RBC # FLD: 13.9 % — SIGNIFICANT CHANGE UP (ref 10.3–14.5)
RED BLOOD CELLS URINE: 1 /HPF
RH IG SCN BLD-IMP: POSITIVE — SIGNIFICANT CHANGE UP
SODIUM SERPL-SCNC: 141 MMOL/L
SODIUM SERPL-SCNC: 141 MMOL/L — SIGNIFICANT CHANGE UP (ref 135–145)
SPECIFIC GRAVITY URINE: 1.01
SQUAMOUS EPITHELIAL CELLS: 0 /HPF
T4 FREE SERPL-MCNC: 1.5 NG/DL
T4 SERPL-MCNC: 7.2 UG/DL
TRIGL SERPL-MCNC: 75 MG/DL
TSH SERPL-ACNC: 1.38 UIU/ML
UROBILINOGEN URINE: NORMAL
WBC # BLD: 5.14 K/UL — SIGNIFICANT CHANGE UP (ref 3.8–10.5)
WBC # FLD AUTO: 5.14 K/UL — SIGNIFICANT CHANGE UP (ref 3.8–10.5)
WBC # FLD AUTO: 5.6 K/UL
WHITE BLOOD CELLS URINE: 0 /HPF

## 2019-09-16 RX ORDER — SODIUM CHLORIDE 9 MG/ML
1000 INJECTION, SOLUTION INTRAVENOUS
Refills: 0 | Status: DISCONTINUED | OUTPATIENT
Start: 2019-09-24 | End: 2019-10-19

## 2019-09-16 RX ORDER — ROSUVASTATIN CALCIUM 5 MG/1
1 TABLET ORAL
Qty: 0 | Refills: 0 | DISCHARGE

## 2019-09-16 RX ORDER — ASPIRIN/CALCIUM CARB/MAGNESIUM 324 MG
1 TABLET ORAL
Qty: 0 | Refills: 0 | DISCHARGE

## 2019-09-16 NOTE — H&P PST ADULT - NSICDXPASTMEDICALHX_GEN_ALL_CORE_FT
PAST MEDICAL HISTORY:  Atrial Fibrillation     Calculus of gallbladder without cholecystitis without obstruction     Cardiac Pacemaker     Colon cancer     Dyslipidemia     GERD (Gastroesophageal Reflux Disease)     HTN (Hypertension)     Incisional hernia without obstruction or gangrene PAST MEDICAL HISTORY:  Atrial Fibrillation     CAD (coronary artery disease) Denies any stents    Calculus of gallbladder without cholecystitis without obstruction     Cardiac Pacemaker     Colon cancer     Dyslipidemia     GERD (Gastroesophageal Reflux Disease)     HTN (Hypertension)     Incisional hernia without obstruction or gangrene

## 2019-09-16 NOTE — H&P PST ADULT - GASTROINTESTINAL COMMENTS
Intermittent  abdominal pain Pre op diagnosis: Incisional hernia without obstruction or gangrene, Calculus of gallbladder without cholecystitis without obstruction., Abdominal scar noted

## 2019-09-16 NOTE — DISCUSSION/SUMMARY
[Patient] : the patient [Minutes spent___] : for [unfilled] ~Uminutes [___ Month(s)] : [unfilled] month(s) [With Me] : with me [Procedure Low Risk] : the procedure risk is low [Patient Low Risk] : the patient is a low surgical risk [Optimized for Surgery] : the patient is optimized for surgery [As per surgery] : as per surgery [Continue] : Continue medications as currently directed [FreeTextEntry3] : amlodipine 5 mg daily, latanoprost 0.005% drops, metoprolol tartrate 50 mg twice daily, quinapril 40 mg daily, tamsulosin 0.4 mg daily [FreeTextEntry1] : \par Revised Cardiac Risk Index for Pre-Operative Risk is 1 points (class II risk) with 6.0 % 30-day risk of death, MI, or cardiac arrest (From Danny 2017).  Hold apixaban (Eliquis) and aspirin 3 days prior to the procedure. Omega 3 fish oil was previously discontinued. Resume apixaban and aspirin when deemed safe to avoid perioperative bleeding. Echocardiogram done 11/15/18 showed normal LV size and wall thickness, with normal oberall systolic function; LVEF = 63%. There were no segmental wall motion abnormalities. Pacemaker assessment done 7/15/19 showed no abnormalities. No additional testing will be needed prior to surgery. Antibiotic prophylaxis for SBE prophylaxis is not necessary.

## 2019-09-16 NOTE — H&P PST ADULT - MUSCULOSKELETAL
details… detailed exam no joint swelling/ROM intact/no joint warmth/no calf tenderness/normal strength/no joint erythema

## 2019-09-16 NOTE — ADDENDUM
[FreeTextEntry1] : I spent 60 minutes face to face time with the patient, from 14:30 - 15:30 on 9/13/19. Thirty minutes were devoted to patient counseling as outlined above in the End of Visit section.  Prior to this visit, I review office notes of Dr. Pierce and Dr. Tejada.

## 2019-09-16 NOTE — H&P PST ADULT - ASSESSMENT
Pre op diagnosis: Incisional hernia without obstruction or gangrene, Calculus of gallbladder without cholecystitis without obstruction. Patient is scheduled for Laparoscopic cholecystectomy, laparoscopic incisional hernia repair scheduled on 9/24/2019.

## 2019-09-16 NOTE — HISTORY OF PRESENT ILLNESS
[Preoperative Visit] : for a medical evaluation prior to surgery [Date of Surgery ___] : on [unfilled] [Surgeon Name ___] : surgeon: [unfilled] [Good] : Good [Cardiovascular Disease] : cardiovascular disease [Alcohol Use] : alcohol use [GI Disease] : gastrointestinal disease [Prior Anesthesia] : Prior anesthesia [Electrocardiogram] : ~T an ECG ~C was performed [Echocardiogram] : ~T an echocardiogram ~C was performed [Fair] : Fair [Scheduled Procedure ___] : a [unfilled] [Fever] : no fever [Fatigue] : no fatigue [Chills] : no chills [Chest Pain] : no chest pain [Cough] : no cough [Dyspnea] : no dyspnea [Urinary Frequency] : no urinary frequency [Dysuria] : no dysuria [Nausea] : no nausea [Vomiting] : no vomiting [Diarrhea] : no diarrhea [Abdominal Pain] : no abdominal pain [Lower Extremity Swelling] : no lower extremity swelling [Easy Bruising] : no easy bruising [Poor Exercise Tolerance] : no poor exercise tolerance [Diabetes] : no diabetes [Pulmonary Disease] : no pulmonary disease [Anti-Platelet Agents] : no anti-platelet agents [Nicotine Dependence] : no nicotine dependence [Renal Disease] : no renal disease [Sleep Apnea] : no sleep apnea [Thromboembolic Problems] : no thromboembolic problems [Frequent use of NSAIDs] : no use of NSAIDs [Clotting Disorder] : no clotting disorder [Bleeding Disorder] : no bleeding disorder [Transfusion Reaction] : no transfusion reaction [Impaired Immunity] : no impaired immunity [Steroid Use in Last 6 Months] : no steroid use in the last six months [Frequent Aspirin Use] : no frequent aspirin use [Prev Anesthesia Reaction] : no previous anesthesia reaction [FreeTextEntry1] : Mr. Mccoy was initially seen 6/26/08 with single vessel CAD;  D1 branch of LAD with moderate diffuse LV dysfunction, no PCI.  In 2001, he underwent PPM (Medtronic URS930 VVI mode) for atrial fibrillation with slow ventricular response and pause up to 4 sec. On 6/1/08, he presented to ED at [facility] with chest pain and /120 mm Hg. He received intravenous nitroglycerin or nitroprusside and was discharged to home after undergoing a stress test.  During office visit 8/19/11, there was intermittent substernal chest pain (squeezing with some burning) radiating to the left shoulder. There was frequently dizziness and insomnia He stopped going to the gym and felt weak. In June 2012, he was admitted to [facility] with left shoulder pain and /110-117 mm Hg, despite taking all medication. He was treated  and discharged after about 12 hours, with no work up for ischemia. There were subsequent symptoms recurrences. He noted significant stress at that time. In August 2012, he used doxazosin only when BP was elevated. Quinapril (Accupril) was stopped and substituted with Diovan. He was no longer taking isosorbide mononitrate (Imdur). He was taking high dose vitamin D supplement. On Thanksgiving day (11/22/12) at 16:30, he developed sudden warmth and dizziness while driving across the [facility] [place]. He opened the window to get more air, made it across the bridge and then pulled over to the side of the road.  He began retching and vomiting, no LOC. There was no abdominal pain or heartburn. He was admitted to [facility], where he stayed for 48 hours. After discharge, and at the time of office visit on 12/24/12, there were periods of dizziness and unsteadiness, but no severe nausea. He had occasional one-two ounce whiskey or cognac, but not daily. He was not smoking. At the time of office visit on 12/13/13, he was not smoking and did not have cravings. He was drinking one to two glasses wine or two ounces whisky / cognac, several days per week. He was using quinapril, not Diovan. 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 He underwent surgical resection 7/9/14. No additional treatment was required. He lost up to twenty pounds after surgery, but subsequently regained weight. There was occasional chest pain while walking, but pain resolved with continued walking. At the time of office  visit on 2/11/15, he noted discomfort from right inguinal hernia and small midline abdominal hernia.  At the time of office visit on 2/10/16, his chief complaint was shortness of breath with exertion. He was in the ED 10 days prior with nasal bleeding. INR was ~2.0. He was hospitalized for one week in October 2016 for pneumonia. At the time of office visit on 8/11/17, self obtained BP noted frequent SBP readings > 140 mm Hg. He underwent PPM generator change on 3/11/19 for battery RYAN. He was diagnosed with acute cholecystitis due to gallstones and after changing diet, there was decreased abdominal pain.  At the time of office visit on 9/13/19, there were no new symptoms. He was scheduled for laparoscopic cholecystectomy with simultaneous repair of small right periumbilical incisional hernia. with Dr. Garcia Held on 9/24/19. He stopped omega 3 fish oil and

## 2019-09-16 NOTE — H&P PST ADULT - NEGATIVE ENMT SYMPTOMS
no recurrent cold sores/no tinnitus/no gum bleeding/no throat pain/no ear pain/no dysphagia/no nasal discharge/no sinus symptoms/no nasal congestion/no nasal obstruction/no post-nasal discharge/no nose bleeds/no abnormal taste sensation/no dry mouth/no hearing difficulty

## 2019-09-16 NOTE — H&P PST ADULT - NSICDXPASTSURGICALHX_GEN_ALL_CORE_FT
PAST SURGICAL HISTORY:  History of colon cancer History of Colon surgery-right hemicolectomy for cancer with a midline abdominal incision    Pacemaker [employer]e   implant date: March 11, 2019  Serial # [id number], Model# [id number] PAST SURGICAL HISTORY:  History of colon cancer History of Colon surgery-right hemicolectomy for cancer with a midline abdominal incision    Pacemaker [employer]ure   implant date: March 11, 2019  Serial # [id number], Model# [id number]

## 2019-09-16 NOTE — H&P PST ADULT - NEGATIVE BREAST SYMPTOMS
no breast tenderness L/no breast lump L/no nipple discharge L/no nipple discharge R/no breast lump R/no breast tenderness R

## 2019-09-16 NOTE — H&P PST ADULT - NEGATIVE OPHTHALMOLOGIC SYMPTOMS
no blurred vision L/no blurred vision R/no discharge R/no pain L/no irritation R/no discharge L/no pain R/no irritation L/no diplopia/no photophobia

## 2019-09-16 NOTE — REASON FOR VISIT
[Anticoagulation] : anticoagulation [Follow-Up - Clinic] : a clinic follow-up of [Atrial Fibrillation] : atrial fibrillation [Cardiomyopathy] : cardiomyopathy [Chest Pain] : chest pain [Coronary Artery Disease] : coronary artery disease [Hyperlipidemia] : hyperlipidemia [Pacemaker Evaluation] : pacemaker ~T evaluation ~C was performed

## 2019-09-16 NOTE — H&P PST ADULT - NEGATIVE NEUROLOGICAL SYMPTOMS
no tremors/no loss of consciousness/no vertigo/no loss of sensation/no difficulty walking/no weakness/no confusion/no focal seizures/no transient paralysis/no generalized seizures/no headache

## 2019-09-16 NOTE — PHYSICAL EXAM
[General Appearance - Well Developed] : well developed [Normal Appearance] : normal appearance [General Appearance - Well Nourished] : well nourished [Well Groomed] : well groomed [No Deformities] : no deformities [General Appearance - In No Acute Distress] : no acute distress [Normal Conjunctiva] : the conjunctiva exhibited no abnormalities [Eyelids - No Xanthelasma] : the eyelids demonstrated no xanthelasmas [Normal Oral Mucosa] : normal oral mucosa [No Oral Pallor] : no oral pallor [No Oral Cyanosis] : no oral cyanosis [Normal Jugular Venous A Waves Present] : normal jugular venous A waves present [Normal Jugular Venous V Waves Present] : normal jugular venous V waves present [No Jugular Venous Burgos A Waves] : no jugular venous burgos A waves [Respiration, Rhythm And Depth] : normal respiratory rhythm and effort [Exaggerated Use Of Accessory Muscles For Inspiration] : no accessory muscle use [Auscultation Breath Sounds / Voice Sounds] : lungs were clear to auscultation bilaterally [Scattered Wheezes] : scattered wheezing [Abdomen Soft] : soft [Abdomen Tenderness] : non-tender [Abdomen Mass (___ Cm)] : no abdominal mass palpated [Abnormal Walk] : normal gait [Gait - Sufficient For Exercise Testing] : the gait was sufficient for exercise testing [Cyanosis, Localized] : no localized cyanosis [Nail Clubbing] : no clubbing of the fingernails [Petechial Hemorrhages (___cm)] : no petechial hemorrhages [Skin Color & Pigmentation] : normal skin color and pigmentation [] : no rash [No Skin Ulcers] : no skin ulcer [No Venous Stasis] : no venous stasis [No Xanthoma] : no  xanthoma was observed [Multiple Papules] : multiple [Smooth] : with smooth borders [Brown] : brown [Black] : black [Multiple] : multiple [Affect] : the affect was normal [Oriented To Time, Place, And Person] : oriented to person, place, and time [No Anxiety] : not feeling anxious [Mood] : the mood was normal [5th Left ICS - MCL] : palpated at the 5th LICS in the midclavicular line [No Precordial Heave] : no precordial heave was noted [Diffuse] : diffuse [Normal Rate] : normal [Rhythm Regular] : regular [Normal S1] : normal S1 [No Murmur] : no murmurs heard [2+] : left 2+ [No Abnormalities] : the abdominal aorta was not enlarged and no bruit was heard [Rt] : varicose veins of the right leg noted [No Pitting Edema] : no pitting edema present [Lt] : varicose veins of the left leg noted [Prolonged Exp Time] : with normal expiratory time [Increased E/I Ratio] : with normal expiratory/inspiratory ratio  [FreeTextEntry1] : ankle circumference 9.0 inches bilaterally; previously 9.25 in bilaterally [Apical Thrill] : no thrill palpable at the apex [Click] : no click [S4] : no S4 [Pericardial Rub] : no pericardial rub [Right Carotid Bruit] : no bruit heard over the right carotid [Right Femoral Bruit] : no bruit heard over the right femoral artery [Left Carotid Bruit] : no bruit heard over the left carotid [Left Femoral Bruit] : no bruit heard over the left femoral artery

## 2019-09-16 NOTE — H&P PST ADULT - HISTORY OF PRESENT ILLNESS
Patient is a 74 year old male with a history of Intermittent right upper quadrant pain for the past 9 months per patient. Patient is s/p CT of the abdomen and Pelvis with abnormal results. Patient was referred  to Dr. Tejada for an evaluation. Pre op diagnosis: Incisional hernia without obstruction or gangrene, Calculus of gallbladder without cholecystitis without obstruction. Patient is scheduled for Laparoscopic cholecystectomy, laparoscopic incisional hernia repair scheduled on 9/24/2019. Patient is a 74 year old male with a history of Intermittent right upper quadrant pain for the past 9 months per patient. Patient is s/p CT of the abdomen and Pelvis with abnormal results. Patient was referred  to Dr. Tejada for an evaluation. Pre op diagnosis: Incisional hernia without obstruction or gangrene, Calculus of gallbladder without cholecystitis without obstruction. Patient is scheduled for Laparoscopic cholecystectomy, laparoscopic incisional hernia repair scheduled on 9/24/2019.     (Patient is both English and [place] speaking, requesting to speak in English- Offered free interpretation services,  Patient refused. Requesting to speak in English.  )

## 2019-09-16 NOTE — PHYSICAL EXAM
[Normal Appearance] : normal appearance [General Appearance - Well Developed] : well developed [Well Groomed] : well groomed [General Appearance - Well Nourished] : well nourished [No Deformities] : no deformities [Normal Conjunctiva] : the conjunctiva exhibited no abnormalities [General Appearance - In No Acute Distress] : no acute distress [Eyelids - No Xanthelasma] : the eyelids demonstrated no xanthelasmas [Normal Oral Mucosa] : normal oral mucosa [No Oral Pallor] : no oral pallor [No Oral Cyanosis] : no oral cyanosis [Normal Jugular Venous A Waves Present] : normal jugular venous A waves present [Normal Jugular Venous V Waves Present] : normal jugular venous V waves present [No Jugular Venous Burgos A Waves] : no jugular venous burgos A waves [Respiration, Rhythm And Depth] : normal respiratory rhythm and effort [Exaggerated Use Of Accessory Muscles For Inspiration] : no accessory muscle use [Auscultation Breath Sounds / Voice Sounds] : lungs were clear to auscultation bilaterally [Scattered Wheezes] : scattered wheezing [Abdomen Tenderness] : non-tender [Abdomen Soft] : soft [Abdomen Mass (___ Cm)] : no abdominal mass palpated [Abnormal Walk] : normal gait [Gait - Sufficient For Exercise Testing] : the gait was sufficient for exercise testing [Cyanosis, Localized] : no localized cyanosis [Nail Clubbing] : no clubbing of the fingernails [Petechial Hemorrhages (___cm)] : no petechial hemorrhages [Skin Color & Pigmentation] : normal skin color and pigmentation [] : no rash [No Skin Ulcers] : no skin ulcer [No Venous Stasis] : no venous stasis [No Xanthoma] : no  xanthoma was observed [Multiple Papules] : multiple [Smooth] : with smooth borders [Brown] : brown [Black] : black [Multiple] : multiple [Oriented To Time, Place, And Person] : oriented to person, place, and time [Affect] : the affect was normal [Mood] : the mood was normal [No Anxiety] : not feeling anxious [5th Left ICS - MCL] : palpated at the 5th LICS in the midclavicular line [No Precordial Heave] : no precordial heave was noted [Diffuse] : diffuse [Normal Rate] : normal [Rhythm Regular] : regular [Normal S1] : normal S1 [No Murmur] : no murmurs heard [2+] : left 2+ [No Abnormalities] : the abdominal aorta was not enlarged and no bruit was heard [Rt] : varicose veins of the right leg noted [No Pitting Edema] : no pitting edema present [Lt] : varicose veins of the left leg noted [Increased E/I Ratio] : with normal expiratory/inspiratory ratio  [Prolonged Exp Time] : with normal expiratory time [FreeTextEntry1] : liver edge is firm and non-nodular with span 10 cm at mid clavicular line (previously measured 9.5) [Apical Thrill] : no thrill palpable at the apex [S4] : no S4 [Click] : no click [Pericardial Rub] : no pericardial rub [Right Carotid Bruit] : no bruit heard over the right carotid [Left Carotid Bruit] : no bruit heard over the left carotid [Right Femoral Bruit] : no bruit heard over the right femoral artery [Left Femoral Bruit] : no bruit heard over the left femoral artery

## 2019-09-16 NOTE — H&P PST ADULT - NSICDXPROBLEM_GEN_ALL_CORE_FT
PROBLEM DIAGNOSES  Problem: Incisional hernia without obstruction or gangrene  Assessment and Plan: Patient is scheduled for Laparoscopic cholecystectomy, laparoscopic incisional hernia repair scheduled on 9/24/2019.   Preop instructions, pepcid, surgical scrub provided. Pt stated understanding. Teach back method utilized.   Pending cardiology evaluation per surgeon and PST- History of Pacemaker, CAD, A- fib on Eliquis. Pending Eliquis plan from        Problem: Snoring  Assessment and Plan: JOSHUA precautions, OR booking notified.      Problem: Hypertension  Assessment and Plan: Patient instructed to take Amlodipine and Quinapril in the AM of surgery with a sip of water.     Problem: Pacemaker  Assessment and Plan: Pacemaker Medtronic   implant date: March 11, 2019  Serial # [id number], Model# [id number]  OR booking notified. PROBLEM DIAGNOSES  Problem: Incisional hernia without obstruction or gangrene  Assessment and Plan: Patient is scheduled for Laparoscopic cholecystectomy, laparoscopic incisional hernia repair scheduled on 9/24/2019.   Preop instructions, pepcid, surgical scrub provided. Pt stated understanding. Teach back method utilized.   Pending cardiology evaluation per surgeon and PST- History of Pacemaker, CAD, A- fib on Eliquis.   Eliquis plan: Per Cardiologist Dr. Kumar -Last dose of Eliquis is on 9/20/2019. Patient is made aware of the plan and verbalized understanding.       Problem: Snoring  Assessment and Plan: JOSHUA precautions, OR booking notified.      Problem: Hypertension  Assessment and Plan: Patient instructed to take Amlodipine and Quinapril in the AM of surgery with a sip of water.     Problem: Pacemaker  Assessment and Plan: Pacemaker Medtronic   implant date: March 11, 2019  Serial # [id number], Model# [id number]  OR booking notified.

## 2019-09-16 NOTE — HISTORY OF PRESENT ILLNESS
[Preoperative Visit] : for a medical evaluation prior to surgery [Date of Surgery ___] : on [unfilled] [Surgeon Name ___] : surgeon: [unfilled] [Good] : Good [Cardiovascular Disease] : cardiovascular disease [Alcohol Use] : alcohol use [GI Disease] : gastrointestinal disease [Prior Anesthesia] : Prior anesthesia [Electrocardiogram] : ~T an ECG ~C was performed [Echocardiogram] : ~T an echocardiogram ~C was performed [Fair] : Fair [Scheduled Procedure ___] : a [unfilled] [Fever] : no fever [Chills] : no chills [Fatigue] : no fatigue [Chest Pain] : no chest pain [Dyspnea] : no dyspnea [Cough] : no cough [Dysuria] : no dysuria [Urinary Frequency] : no urinary frequency [Vomiting] : no vomiting [Nausea] : no nausea [Diarrhea] : no diarrhea [Abdominal Pain] : no abdominal pain [Lower Extremity Swelling] : no lower extremity swelling [Easy Bruising] : no easy bruising [Poor Exercise Tolerance] : no poor exercise tolerance [Diabetes] : no diabetes [Pulmonary Disease] : no pulmonary disease [Anti-Platelet Agents] : no anti-platelet agents [Renal Disease] : no renal disease [Nicotine Dependence] : no nicotine dependence [Sleep Apnea] : no sleep apnea [Thromboembolic Problems] : no thromboembolic problems [Clotting Disorder] : no clotting disorder [Frequent use of NSAIDs] : no use of NSAIDs [Bleeding Disorder] : no bleeding disorder [Transfusion Reaction] : no transfusion reaction [Steroid Use in Last 6 Months] : no steroid use in the last six months [Impaired Immunity] : no impaired immunity [Prev Anesthesia Reaction] : no previous anesthesia reaction [Frequent Aspirin Use] : no frequent aspirin use [FreeTextEntry1] : Mr. Mccoy was initially seen 6/26/08 with single vessel CAD;  D1 branch of LAD with moderate diffuse LV dysfunction, no PCI.  In 2001, he underwent PPM (Medtronic JAW160 VVI mode) for atrial fibrillation with slow ventricular response and pause up to 4 sec. On 6/1/08, he presented to ED at [facility] with chest pain and /120 mm Hg. He received intravenous nitroglycerin or nitroprusside and was discharged to home after undergoing a stress test.  During office visit 8/19/11, there was intermittent substernal chest pain (squeezing with some burning) radiating to the left shoulder. There was frequently dizziness and insomnia He stopped going to the gym and felt weak. In June 2012, he was admitted to [facility] with left shoulder pain and /110-117 mm Hg, despite taking all medication. He was treated  and discharged after about 12 hours, with no work up for ischemia. There were subsequent symptoms recurrences. He noted significant stress at that time. In August 2012, he used doxazosin only when BP was elevated. Quinapril (Accupril) was stopped and substituted with Diovan. He was no longer taking isosorbide mononitrate (Imdur). He was taking high dose vitamin D supplement. On Thanksgiving day (11/22/12) at 16:30, he developed sudden warmth and dizziness while [place] [facility] [place]. He opened the window to get more air, made it across the bridge and then pulled over to the side of the road.  He began retching and vomiting, no LOC. There was no abdominal pain or heartburn. He was admitted to [facility], where he stayed for 48 hours. After discharge, and at the time of office visit on 12/24/12, there were periods of dizziness and unsteadiness, but no severe nausea. He had occasional one-two ounce whiskey or cognac, but not daily. He was not smoking. At the time of office visit on 12/13/13, he was not smoking and did not have cravings. He was drinking one to two glasses wine or two ounces whisky / cognac, several days per week. He was using quinapril, not Diovan. 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 He underwent surgical resection 7/9/14. No additional treatment was required. He lost up to twenty pounds after surgery, but subsequently regained weight. There was occasional chest pain while walking, but pain resolved with continued walking. At the time of office  visit on 2/11/15, he noted discomfort from right inguinal hernia and small midline abdominal hernia.  At the time of office visit on 2/10/16, his chief complaint was shortness of breath with exertion. He was in the ED 10 days prior with nasal bleeding. INR was ~2.0. He was hospitalized for one week in October 2016 for pneumonia. At the time of office visit on 8/11/17, self obtained BP noted frequent SBP readings > 140 mm Hg. He underwent PPM generator change on 3/11/19 for battery RYAN. He was diagnosed with acute cholecystitis due to gallstones and after changing diet, there was decreased abdominal pain.  At the time of office visit on 9/13/19, there were no new symptoms. He was scheduled for laparoscopic cholecystectomy with simultaneous repair of small right periumbilical incisional hernia. with Dr. Garcia Held on 9/24/19. He stopped omega 3 fish oil and

## 2019-09-16 NOTE — H&P PST ADULT - NEGATIVE GENERAL GENITOURINARY SYMPTOMS
no incontinence/no flank pain L/no hematuria/no flank pain R/no bladder infections/no urine discoloration/normal urinary frequency/no dysuria/no urinary hesitancy

## 2019-09-16 NOTE — H&P PST ADULT - RS GEN PE MLT RESP DETAILS PC
airway patent/breath sounds equal/no rhonchi/no wheezes/respirations non-labored/no rales/clear to auscultation bilaterally/good air movement

## 2019-09-23 ENCOUNTER — FORM ENCOUNTER (OUTPATIENT)
Age: 74
End: 2019-09-23

## 2019-09-24 ENCOUNTER — OUTPATIENT (OUTPATIENT)
Dept: OUTPATIENT SERVICES | Facility: HOSPITAL | Age: 74
LOS: 1 days | Discharge: HOME | End: 2019-09-24
Payer: MEDICARE

## 2019-09-24 ENCOUNTER — RESULT REVIEW (OUTPATIENT)
Age: 74
End: 2019-09-24

## 2019-09-24 ENCOUNTER — APPOINTMENT (OUTPATIENT)
Dept: SURGERY | Facility: HOSPITAL | Age: 74
End: 2019-09-24

## 2019-09-24 VITALS
SYSTOLIC BLOOD PRESSURE: 132 MMHG | RESPIRATION RATE: 18 BRPM | HEART RATE: 62 BPM | DIASTOLIC BLOOD PRESSURE: 84 MMHG | OXYGEN SATURATION: 97 %

## 2019-09-24 VITALS
SYSTOLIC BLOOD PRESSURE: 134 MMHG | DIASTOLIC BLOOD PRESSURE: 85 MMHG | HEIGHT: 69 IN | WEIGHT: 190.04 LBS | HEART RATE: 66 BPM | TEMPERATURE: 98 F | OXYGEN SATURATION: 95 % | RESPIRATION RATE: 16 BRPM

## 2019-09-24 DIAGNOSIS — Z95.0 PRESENCE OF CARDIAC PACEMAKER: Chronic | ICD-10-CM

## 2019-09-24 DIAGNOSIS — Z85.038 PERSONAL HISTORY OF OTHER MALIGNANT NEOPLASM OF LARGE INTESTINE: Chronic | ICD-10-CM

## 2019-09-24 DIAGNOSIS — K80.20 CALCULUS OF GALLBLADDER WITHOUT CHOLECYSTITIS WITHOUT OBSTRUCTION: ICD-10-CM

## 2019-09-24 PROCEDURE — 74018 RADEX ABDOMEN 1 VIEW: CPT | Mod: 26

## 2019-09-24 PROCEDURE — 49561: CPT | Mod: 59

## 2019-09-24 PROCEDURE — 49561: CPT | Mod: AS,59

## 2019-09-24 PROCEDURE — 47562 LAPAROSCOPIC CHOLECYSTECTOMY: CPT

## 2019-09-24 PROCEDURE — 49568: CPT

## 2019-09-24 PROCEDURE — 88304 TISSUE EXAM BY PATHOLOGIST: CPT | Mod: 26

## 2019-09-24 PROCEDURE — 49568: CPT | Mod: AS

## 2019-09-24 PROCEDURE — 47562 LAPAROSCOPIC CHOLECYSTECTOMY: CPT | Mod: AS

## 2019-09-24 RX ORDER — ONDANSETRON 8 MG/1
4 TABLET, FILM COATED ORAL ONCE
Refills: 0 | Status: DISCONTINUED | OUTPATIENT
Start: 2019-09-24 | End: 2019-10-19

## 2019-09-24 RX ORDER — HYDROMORPHONE HYDROCHLORIDE 2 MG/ML
0.5 INJECTION INTRAMUSCULAR; INTRAVENOUS; SUBCUTANEOUS
Refills: 0 | Status: DISCONTINUED | OUTPATIENT
Start: 2019-09-24 | End: 2019-09-24

## 2019-09-24 RX ORDER — HYDROMORPHONE HYDROCHLORIDE 2 MG/ML
1 INJECTION INTRAMUSCULAR; INTRAVENOUS; SUBCUTANEOUS
Refills: 0 | Status: DISCONTINUED | OUTPATIENT
Start: 2019-09-24 | End: 2019-09-24

## 2019-09-24 RX ADMIN — SODIUM CHLORIDE 75 MILLILITER(S): 9 INJECTION, SOLUTION INTRAVENOUS at 10:24

## 2019-09-24 NOTE — ASU PATIENT PROFILE, ADULT - PSH
History of colon cancer  History of Colon surgery-right hemicolectomy for cancer with a midline abdominal incision  Pacemaker  [employer]     implant date: March 11, 2019  Serial # [id number], Model# [id number]

## 2019-09-24 NOTE — BRIEF OPERATIVE NOTE - NSICDXBRIEFPREOP_GEN_ALL_CORE_FT
PRE-OP DIAGNOSIS:  Incisional hernia 24-Sep-2019 09:30:23  Daniel Perera  Cholelithiasis 24-Sep-2019 09:30:13  Daniel Perera

## 2019-09-24 NOTE — ASU PATIENT PROFILE, ADULT - PMH
Atrial Fibrillation    CAD (coronary artery disease)  Denies any stents  Calculus of gallbladder without cholecystitis without obstruction    Cardiac Pacemaker    Colon cancer    Dyslipidemia    GERD (Gastroesophageal Reflux Disease)    HTN (Hypertension)    Incisional hernia without obstruction or gangrene

## 2019-09-24 NOTE — ASU DISCHARGE PLAN (ADULT/PEDIATRIC) - CALL YOUR DOCTOR IF YOU HAVE ANY OF THE FOLLOWING:
Wound/Surgical Site with redness, or foul smelling discharge or pus/Bleeding that does not stop/Swelling that gets worse/Unable to urinate/Fever greater than (need to indicate Fahrenheit or Celsius)/Nausea and vomiting that does not stop Bleeding that does not stop/Pain not relieved by Medications/Unable to urinate/Fever greater than (need to indicate Fahrenheit or Celsius)/Inability to tolerate liquids or foods/Nausea and vomiting that does not stop/Swelling that gets worse/Wound/Surgical Site with redness, or foul smelling discharge or pus

## 2019-09-24 NOTE — BRIEF OPERATIVE NOTE - NSICDXBRIEFPOSTOP_GEN_ALL_CORE_FT
POST-OP DIAGNOSIS:  Incisional hernia 24-Sep-2019 09:30:43  Daniel Perera  Cholelithiasis 24-Sep-2019 09:30:37  Daniel Perera

## 2019-09-24 NOTE — BRIEF OPERATIVE NOTE - NSICDXBRIEFPROCEDURE_GEN_ALL_CORE_FT
PROCEDURES:  Intraoper cholangiogram 24-Sep-2019 09:31:14  Daniel Perera  Incisional hernia repair with mesh 24-Sep-2019 09:30:04  Daniel Perera  Cholecystectomy, laparoscopic 24-Sep-2019 09:29:56  Daniel Perera

## 2019-09-24 NOTE — BRIEF OPERATIVE NOTE - OPERATION/FINDINGS
GB with stones, normal cholangiogram    fat containing incisional hernia repair with medium covidien patch

## 2019-09-26 PROBLEM — I25.10 ATHEROSCLEROTIC HEART DISEASE OF NATIVE CORONARY ARTERY WITHOUT ANGINA PECTORIS: Chronic | Status: ACTIVE | Noted: 2019-09-16

## 2019-09-26 PROBLEM — C18.9 MALIGNANT NEOPLASM OF COLON, UNSPECIFIED: Chronic | Status: ACTIVE | Noted: 2019-09-16

## 2019-09-26 PROBLEM — K43.2 INCISIONAL HERNIA WITHOUT OBSTRUCTION OR GANGRENE: Chronic | Status: ACTIVE | Noted: 2019-09-16

## 2019-09-26 PROBLEM — K80.20 CALCULUS OF GALLBLADDER WITHOUT CHOLECYSTITIS WITHOUT OBSTRUCTION: Chronic | Status: ACTIVE | Noted: 2019-09-16

## 2019-10-02 LAB — SURGICAL PATHOLOGY STUDY: SIGNIFICANT CHANGE UP

## 2019-10-07 ENCOUNTER — APPOINTMENT (OUTPATIENT)
Dept: SURGERY | Facility: CLINIC | Age: 74
End: 2019-10-07
Payer: MEDICARE

## 2019-10-07 VITALS
HEART RATE: 70 BPM | TEMPERATURE: 97.8 F | WEIGHT: 185 LBS | SYSTOLIC BLOOD PRESSURE: 128 MMHG | BODY MASS INDEX: 29.03 KG/M2 | DIASTOLIC BLOOD PRESSURE: 78 MMHG | HEIGHT: 67 IN

## 2019-10-07 DIAGNOSIS — Z98.890 OTHER SPECIFIED POSTPROCEDURAL STATES: ICD-10-CM

## 2019-10-07 PROCEDURE — 99024 POSTOP FOLLOW-UP VISIT: CPT

## 2019-10-22 ENCOUNTER — APPOINTMENT (OUTPATIENT)
Dept: ELECTROPHYSIOLOGY | Facility: CLINIC | Age: 74
End: 2019-10-22
Payer: MEDICARE

## 2019-10-22 PROCEDURE — 93279 PRGRMG DEV EVAL PM/LDLS PM: CPT

## 2019-10-28 ENCOUNTER — APPOINTMENT (OUTPATIENT)
Dept: SURGERY | Facility: CLINIC | Age: 74
End: 2019-10-28

## 2019-10-29 ENCOUNTER — APPOINTMENT (OUTPATIENT)
Dept: GASTROENTEROLOGY | Facility: CLINIC | Age: 74
End: 2019-10-29

## 2019-11-04 ENCOUNTER — RX RENEWAL (OUTPATIENT)
Age: 74
End: 2019-11-04

## 2020-01-10 ENCOUNTER — APPOINTMENT (OUTPATIENT)
Dept: ELECTROPHYSIOLOGY | Facility: CLINIC | Age: 75
End: 2020-01-10
Payer: MEDICARE

## 2020-01-10 PROCEDURE — 93294 REM INTERROG EVL PM/LDLS PM: CPT

## 2020-01-10 PROCEDURE — 93296 REM INTERROG EVL PM/IDS: CPT

## 2020-02-28 ENCOUNTER — NON-APPOINTMENT (OUTPATIENT)
Age: 75
End: 2020-02-28

## 2020-02-28 ENCOUNTER — APPOINTMENT (OUTPATIENT)
Dept: CARDIOLOGY | Facility: CLINIC | Age: 75
End: 2020-02-28
Payer: MEDICARE

## 2020-02-28 VITALS
WEIGHT: 181 LBS | HEIGHT: 67 IN | DIASTOLIC BLOOD PRESSURE: 74 MMHG | BODY MASS INDEX: 28.41 KG/M2 | HEART RATE: 66 BPM | OXYGEN SATURATION: 99 % | SYSTOLIC BLOOD PRESSURE: 131 MMHG | RESPIRATION RATE: 12 BRPM

## 2020-02-28 VITALS — HEART RATE: 65 BPM | SYSTOLIC BLOOD PRESSURE: 132 MMHG | DIASTOLIC BLOOD PRESSURE: 60 MMHG | OXYGEN SATURATION: 97 %

## 2020-02-28 VITALS — DIASTOLIC BLOOD PRESSURE: 66 MMHG | OXYGEN SATURATION: 97 % | SYSTOLIC BLOOD PRESSURE: 125 MMHG | HEART RATE: 63 BPM

## 2020-02-28 VITALS — SYSTOLIC BLOOD PRESSURE: 145 MMHG | DIASTOLIC BLOOD PRESSURE: 66 MMHG | HEART RATE: 65 BPM | OXYGEN SATURATION: 98 %

## 2020-02-28 VITALS — OXYGEN SATURATION: 96 % | HEART RATE: 61 BPM | SYSTOLIC BLOOD PRESSURE: 134 MMHG | DIASTOLIC BLOOD PRESSURE: 67 MMHG

## 2020-02-28 VITALS — HEART RATE: 61 BPM | SYSTOLIC BLOOD PRESSURE: 123 MMHG | DIASTOLIC BLOOD PRESSURE: 63 MMHG | OXYGEN SATURATION: 97 %

## 2020-02-28 PROCEDURE — 93040 RHYTHM ECG WITH REPORT: CPT | Mod: 59

## 2020-02-28 PROCEDURE — 99215 OFFICE O/P EST HI 40 MIN: CPT

## 2020-02-28 PROCEDURE — 36415 COLL VENOUS BLD VENIPUNCTURE: CPT

## 2020-02-28 PROCEDURE — 93000 ELECTROCARDIOGRAM COMPLETE: CPT

## 2020-02-28 NOTE — REASON FOR VISIT
[Anticoagulation] : anticoagulation [Follow-Up - Clinic] : a clinic follow-up of [Cardiomyopathy] : cardiomyopathy [Atrial Fibrillation] : atrial fibrillation [Chest Pain] : chest pain [Pacemaker Evaluation] : pacemaker ~T evaluation ~C was performed [Hyperlipidemia] : hyperlipidemia [Coronary Artery Disease] : coronary artery disease

## 2020-02-29 LAB
25(OH)D3 SERPL-MCNC: 31.7 NG/ML
ALBUMIN SERPL ELPH-MCNC: 4.3 G/DL
ALP BLD-CCNC: 116 U/L
ALT SERPL-CCNC: 22 U/L
ANION GAP SERPL CALC-SCNC: 14 MMOL/L
APPEARANCE: CLEAR
AST SERPL-CCNC: 24 U/L
BACTERIA: NEGATIVE
BASOPHILS # BLD AUTO: 0.06 K/UL
BASOPHILS NFR BLD AUTO: 1.3 %
BILIRUB SERPL-MCNC: 1.3 MG/DL
BILIRUBIN URINE: NEGATIVE
BLOOD URINE: NEGATIVE
BUN SERPL-MCNC: 22 MG/DL
CALCIUM SERPL-MCNC: 9.2 MG/DL
CHLORIDE SERPL-SCNC: 104 MMOL/L
CHOLEST SERPL-MCNC: 130 MG/DL
CHOLEST/HDLC SERPL: 3.6 RATIO
CO2 SERPL-SCNC: 21 MMOL/L
COLOR: NORMAL
CREAT SERPL-MCNC: 0.95 MG/DL
CRP SERPL HS-MCNC: 1.01 MG/L
EOSINOPHIL # BLD AUTO: 0.22 K/UL
EOSINOPHIL NFR BLD AUTO: 4.7 %
ESTIMATED AVERAGE GLUCOSE: 108 MG/DL
GLUCOSE QUALITATIVE U: NEGATIVE
GLUCOSE SERPL-MCNC: 69 MG/DL
HBA1C MFR BLD HPLC: 5.4 %
HCT VFR BLD CALC: 50.8 %
HDLC SERPL-MCNC: 36 MG/DL
HGB BLD-MCNC: 16.6 G/DL
HYALINE CASTS: 0 /LPF
IMM GRANULOCYTES NFR BLD AUTO: 0.2 %
KETONES URINE: NEGATIVE
LDLC SERPL CALC-MCNC: 60 MG/DL
LEUKOCYTE ESTERASE URINE: NEGATIVE
LYMPHOCYTES # BLD AUTO: 1.35 K/UL
LYMPHOCYTES NFR BLD AUTO: 28.7 %
MAGNESIUM SERPL-MCNC: 2.1 MG/DL
MAN DIFF?: NORMAL
MCHC RBC-ENTMCNC: 30.2 PG
MCHC RBC-ENTMCNC: 32.7 GM/DL
MCV RBC AUTO: 92.4 FL
MICROSCOPIC-UA: NORMAL
MONOCYTES # BLD AUTO: 0.3 K/UL
MONOCYTES NFR BLD AUTO: 6.4 %
NEUTROPHILS # BLD AUTO: 2.77 K/UL
NEUTROPHILS NFR BLD AUTO: 58.7 %
NITRITE URINE: NEGATIVE
PH URINE: 5
PLATELET # BLD AUTO: 154 K/UL
POTASSIUM SERPL-SCNC: 4.5 MMOL/L
PROT SERPL-MCNC: 6.9 G/DL
PROTEIN URINE: NEGATIVE
RBC # BLD: 5.5 M/UL
RBC # FLD: 13.9 %
RED BLOOD CELLS URINE: 0 /HPF
SODIUM SERPL-SCNC: 140 MMOL/L
SPECIFIC GRAVITY URINE: 1.01
SQUAMOUS EPITHELIAL CELLS: 0 /HPF
T4 FREE SERPL-MCNC: 1.3 NG/DL
TRIGL SERPL-MCNC: 173 MG/DL
TSH SERPL-ACNC: 1.71 UIU/ML
URATE SERPL-MCNC: 5.6 MG/DL
UROBILINOGEN URINE: NORMAL
WBC # FLD AUTO: 4.71 K/UL
WHITE BLOOD CELLS URINE: 0 /HPF

## 2020-03-01 NOTE — HISTORY OF PRESENT ILLNESS
[Preoperative Visit] : for a medical evaluation prior to surgery [Scheduled Procedure ___] : a [unfilled] [Surgeon Name ___] : surgeon: [unfilled] [Date of Surgery ___] : on [unfilled] [Good] : Good [Cardiovascular Disease] : cardiovascular disease [GI Disease] : gastrointestinal disease [Alcohol Use] : alcohol use [Prior Anesthesia] : Prior anesthesia [Electrocardiogram] : ~T an ECG ~C was performed [Echocardiogram] : ~T an echocardiogram ~C was performed [Fair] : Fair [Fever] : no fever [Chills] : no chills [Fatigue] : no fatigue [Chest Pain] : no chest pain [Dyspnea] : no dyspnea [Cough] : no cough [Dysuria] : no dysuria [Urinary Frequency] : no urinary frequency [Nausea] : no nausea [Vomiting] : no vomiting [Diarrhea] : no diarrhea [Abdominal Pain] : no abdominal pain [Easy Bruising] : no easy bruising [Lower Extremity Swelling] : no lower extremity swelling [Poor Exercise Tolerance] : no poor exercise tolerance [Diabetes] : no diabetes [Pulmonary Disease] : no pulmonary disease [Anti-Platelet Agents] : no anti-platelet agents [Nicotine Dependence] : no nicotine dependence [Renal Disease] : no renal disease [Sleep Apnea] : no sleep apnea [Thromboembolic Problems] : no thromboembolic problems [Clotting Disorder] : no clotting disorder [Frequent use of NSAIDs] : no use of NSAIDs [Bleeding Disorder] : no bleeding disorder [Transfusion Reaction] : no transfusion reaction [Impaired Immunity] : no impaired immunity [Steroid Use in Last 6 Months] : no steroid use in the last six months [Prev Anesthesia Reaction] : no previous anesthesia reaction [Frequent Aspirin Use] : no frequent aspirin use [FreeTextEntry1] : Mr. Mccoy was initially seen 6/26/08 with single vessel CAD;  D1 branch of LAD with moderate diffuse LV dysfunction, no PCI.  In 2001, he underwent PPM (Medtronic [id number] VVI mode) for atrial fibrillation with slow ventricular response and pause up to 4 sec. On 6/1/08, he presented to ED at [facility] with chest pain and /120 mm Hg. He received IV nitroglycerin (or nitroprusside) and was discharged to home after undergoing stress test.  During office visit 8/19/11, there was intermittent substernal chest pain (squeezing with some burning) radiating to the left shoulder. There was frequently dizziness and insomnia He stopped going to the gym and felt weak. In June 2012, he was admitted to [facility] with left shoulder pain and  / 110-117 mm Hg, despite all medication. He was treated  and discharged after about 12 hours, with no work up for ischemia. In August 2012, he used doxazosin only when BP was elevated. Quinapril (Accupril) was stopped and substituted with Diovan. He stopped isosorbide mononitrate (Imdur). On Thanksgiving day (11/22/12) he developed sudden warmth and dizziness while [place] [facility] [place]. He opened the window to get more air, made it across the bridge and then pulled over to the side of the road.  He began retching and vomiting, no LOC. There was no abdominal pain or heartburn. He was admitted to [facility] for 48 hours. After discharge, and at the time of office visit on 12/24/12, there were periods of dizziness and unsteadiness, but no severe nausea. He had occasional one-two ounce whiskey or cognac. He was not smoking. At the time of office visit on 12/13/13, he was not smoking and had no cravings. He drank one to two glasses wine or two ounces whisky / cognac, several days per week. He was used quinapril, not Diovan. 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 He underwent surgical resection 7/9/14. No additional treatment was required. He lost up to twenty pounds after surgery, but subsequently regained weight. There was occasional chest pain while walking, but pain resolved with continued walking. At the time of office  visit on 2/11/15, he noted discomfort from right inguinal hernia and small midline abdominal hernia.  At the time of office visit 2/10/16, his chief complaint was exertional shortness of breath. He was in the ED 10 days prior with nasal bleeding. INR ~2.0. He was hospitalized for one week in October 2016 for pneumonia. At the time of office visit 8/11/17, self obtained SBP frequently > 140 mm Hg. He underwent PPM generator change 3/11/19 for battery RYAN. He was diagnosed with acute cholecystitis with gallstones and after changing diet, there was decreased abdominal pain.  At the time of office visit  9/13/19, there were no new symptoms. He underwent laparoscopic cholecystectomy with simultaneous repair of small right periumbilical incisional hernia with Dr. Garcia Held 9/24/19. There were no complications. He felt well during visit 2/28/2020, with no new symptoms.

## 2020-03-01 NOTE — DISCUSSION/SUMMARY
[Patient] : the patient [Minutes spent___] : for [unfilled] ~Uminutes [___ Month(s)] : [unfilled] month(s) [With Me] : with me [Procedure Low Risk] : the procedure risk is low [Patient Low Risk] : the patient is a low surgical risk [Optimized for Surgery] : the patient is optimized for surgery [As per surgery] : as per surgery [Continue] : Continue medications as currently directed [FreeTextEntry3] : amlodipine 5 mg daily, latanoprost 0.005% drops, metoprolol tartrate 50 mg twice daily, quinapril 40 mg daily, tamsulosin 0.4 mg daily [FreeTextEntry1] : \par WEIGHT GOALS:\par \par Category				BMI range - kg/m2	BMI Prime\par Very severely underweight		less than 15		less than 0.60	\par Severely underweight			from 15.0 to 16.0		from 0.60 to 0.64	\par Underweight				from 16.0 to 18.5		from 0.64 to 0.74	\par Normal (healthy weight)			from 18.5 to 25		from 0.74 to 1.0	\par Overweight				from 25 to 30		from 1.0 to 1.2	\par Obese Class I (Moderately obese)		from 30 to 35		from 1.2 to 1.4	\par Obese Class II (Severely obese)		from 35 to 40		from 1.4 to 1.6	\par Obese Class III (Very severely obese)	over 40			over 1.6	\par \par Your current weight is 181 lbs. Given current weight and height 5'7", your calculated body mass index (BMI) is 28.4 kg/sqm. Normal BMI is 18.5-25 kg/sqm. Thus current weight is in the overweight category. Abdominal waist circumference is measured at the level of the umbilicus and is thus not a pants waist measurement. Your current abdominal waist circumference is 40.5 inches. Normal abdominal waist circumference is < 32 inches for women and < 37 inches for men\par \par MEDITERRANEAN DIET:\par The Mediterranean diet does not limit calories. Instead, it provides guidelines for what foods you should eat more of and what foods you should eat less of. \par Every day: \par • Eat several servings of plant foods. These include fruits and vegetables, potatoes, breads and whole grains, beans, nuts, and seeds. As much as you can, eat fresh rather than canned or frozen foods. \par • Use olive oil for cooking and salad dressings. You can have up to 35% of your calories from fats, including olive and other vegetable oils. However, limit the amount you eat of saturated fats like butter, margarine, lard, and animal fat. They should add up to no more than 8 percent of your total calories. Include "partially hydrogenated" oils in your saturated fat limit. \par • Every day, have a small amount of cheese and yogurt. Look for low-fat and non-fat types. Think of cheese as a condiment like salt or pepper, not a main dish. For example, you can sprinkle a little Parmesan cheese on pasta or put a little cheese in a salad. Cheese contains saturated fat. Mozzarella cheese is much lower in fat than most types of cheese. \par • If alcohol is not a problem for you, have one (for women) or two (for men) glasses of red wine with dinner. If alcohol is not a good idea for you, try adding grape juice to your diet instead. It gives you the same heart-protecting benefits of wine without alcohol. It is very sweet, so you might use it for your "dessert." \par Every week: \par • Eat some fish and poultry. Do not eat chicken or turkey skin. \par • Eat no more than 4 eggs a week, including eggs used in cooking. Egg yolks contain saturated fat. \par • Eat no more than 2 or 3 sweets or desserts that contain lots of sugar or honey. Try fresh fruit or sweets made with fruit concentrate instead. \par Every month: \par • Eat no more than 12 to 16 ounces of red meat. Lean meat is preferable.\par

## 2020-03-01 NOTE — PHYSICAL EXAM
[General Appearance - Well Developed] : well developed [Normal Appearance] : normal appearance [Well Groomed] : well groomed [No Deformities] : no deformities [General Appearance - Well Nourished] : well nourished [General Appearance - In No Acute Distress] : no acute distress [Normal Conjunctiva] : the conjunctiva exhibited no abnormalities [Eyelids - No Xanthelasma] : the eyelids demonstrated no xanthelasmas [No Oral Pallor] : no oral pallor [Normal Oral Mucosa] : normal oral mucosa [Normal Jugular Venous A Waves Present] : normal jugular venous A waves present [No Oral Cyanosis] : no oral cyanosis [No Jugular Venous Burgos A Waves] : no jugular venous burgos A waves [Normal Jugular Venous V Waves Present] : normal jugular venous V waves present [Respiration, Rhythm And Depth] : normal respiratory rhythm and effort [Exaggerated Use Of Accessory Muscles For Inspiration] : no accessory muscle use [Auscultation Breath Sounds / Voice Sounds] : lungs were clear to auscultation bilaterally [Scattered Wheezes] : scattered wheezing [Abdomen Soft] : soft [Abdomen Mass (___ Cm)] : no abdominal mass palpated [Abdomen Tenderness] : non-tender [Abnormal Walk] : normal gait [Gait - Sufficient For Exercise Testing] : the gait was sufficient for exercise testing [Nail Clubbing] : no clubbing of the fingernails [Cyanosis, Localized] : no localized cyanosis [Skin Color & Pigmentation] : normal skin color and pigmentation [Petechial Hemorrhages (___cm)] : no petechial hemorrhages [] : no rash [No Venous Stasis] : no venous stasis [No Skin Ulcers] : no skin ulcer [No Xanthoma] : no  xanthoma was observed [Smooth] : with smooth borders [Multiple Papules] : multiple [Brown] : brown [Black] : black [Multiple] : multiple [Oriented To Time, Place, And Person] : oriented to person, place, and time [Mood] : the mood was normal [Affect] : the affect was normal [Diffuse] : diffuse [5th Left ICS - MCL] : palpated at the 5th LICS in the midclavicular line [No Anxiety] : not feeling anxious [No Precordial Heave] : no precordial heave was noted [Normal Rate] : normal [Normal S1] : normal S1 [Rhythm Regular] : regular [No Murmur] : no murmurs heard [No Abnormalities] : the abdominal aorta was not enlarged and no bruit was heard [2+] : left 2+ [No Pitting Edema] : no pitting edema present [Lt] : varicose veins of the left leg noted [Rt] : varicose veins of the right leg noted [Increased E/I Ratio] : with normal expiratory/inspiratory ratio  [Prolonged Exp Time] : with normal expiratory time [Apical Thrill] : no thrill palpable at the apex [FreeTextEntry1] : ankle circumference 9.0 inches bilaterally; previously 9.0 - 9.25 in bilaterally [Click] : no click [S4] : no S4 [Pericardial Rub] : no pericardial rub [Right Carotid Bruit] : no bruit heard over the right carotid [Left Carotid Bruit] : no bruit heard over the left carotid [Left Femoral Bruit] : no bruit heard over the left femoral artery [Right Femoral Bruit] : no bruit heard over the right femoral artery

## 2020-03-01 NOTE — ADDENDUM
[FreeTextEntry1] : I spent 60 minutes face to face time with the patient, from 13:30 - 15:30 on 9/13/19. Thirty minutes were devoted to patient counseling as outlined above in the End of Visit section.  Prior to this visit, I review office notes of Dr. Pierce and Dr. Tejada.

## 2020-04-22 ENCOUNTER — APPOINTMENT (OUTPATIENT)
Dept: ELECTROPHYSIOLOGY | Facility: CLINIC | Age: 75
End: 2020-04-22
Payer: MEDICARE

## 2020-04-22 PROCEDURE — 93294 REM INTERROG EVL PM/LDLS PM: CPT

## 2020-04-22 PROCEDURE — 93296 REM INTERROG EVL PM/IDS: CPT

## 2020-06-06 ENCOUNTER — RX RENEWAL (OUTPATIENT)
Age: 75
End: 2020-06-06

## 2020-07-22 ENCOUNTER — APPOINTMENT (OUTPATIENT)
Dept: ELECTROPHYSIOLOGY | Facility: CLINIC | Age: 75
End: 2020-07-22
Payer: MEDICARE

## 2020-07-22 PROCEDURE — 93294 REM INTERROG EVL PM/LDLS PM: CPT

## 2020-07-22 PROCEDURE — 93296 REM INTERROG EVL PM/IDS: CPT

## 2020-08-31 ENCOUNTER — APPOINTMENT (OUTPATIENT)
Dept: SURGERY | Facility: CLINIC | Age: 75
End: 2020-08-31
Payer: MEDICARE

## 2020-08-31 VITALS
HEIGHT: 67 IN | TEMPERATURE: 98.2 F | DIASTOLIC BLOOD PRESSURE: 64 MMHG | HEART RATE: 81 BPM | BODY MASS INDEX: 28.56 KG/M2 | SYSTOLIC BLOOD PRESSURE: 157 MMHG | WEIGHT: 182 LBS

## 2020-08-31 PROCEDURE — 99214 OFFICE O/P EST MOD 30 MIN: CPT

## 2020-09-01 ENCOUNTER — APPOINTMENT (OUTPATIENT)
Dept: CARDIOLOGY | Facility: CLINIC | Age: 75
End: 2020-09-01
Payer: MEDICARE

## 2020-09-01 ENCOUNTER — APPOINTMENT (OUTPATIENT)
Dept: ELECTROPHYSIOLOGY | Facility: CLINIC | Age: 75
End: 2020-09-01

## 2020-09-01 ENCOUNTER — APPOINTMENT (OUTPATIENT)
Dept: CARDIOLOGY | Facility: CLINIC | Age: 75
End: 2020-09-01

## 2020-09-01 DIAGNOSIS — Z79.01 LONG TERM (CURRENT) USE OF ANTICOAGULANTS: ICD-10-CM

## 2020-09-01 PROCEDURE — 99215 OFFICE O/P EST HI 40 MIN: CPT | Mod: 95

## 2020-09-03 VITALS
HEART RATE: 77 BPM | HEIGHT: 67 IN | DIASTOLIC BLOOD PRESSURE: 89 MMHG | SYSTOLIC BLOOD PRESSURE: 138 MMHG | WEIGHT: 181 LBS | RESPIRATION RATE: 16 BRPM | BODY MASS INDEX: 28.41 KG/M2

## 2020-09-03 VITALS — SYSTOLIC BLOOD PRESSURE: 149 MMHG | DIASTOLIC BLOOD PRESSURE: 93 MMHG | HEART RATE: 72 BPM

## 2020-09-03 VITALS — SYSTOLIC BLOOD PRESSURE: 140 MMHG | HEART RATE: 71 BPM | DIASTOLIC BLOOD PRESSURE: 90 MMHG

## 2020-09-03 NOTE — HISTORY OF PRESENT ILLNESS
[Preoperative Visit] : for a medical evaluation prior to surgery [Scheduled Procedure ___] : a [unfilled] [Surgeon Name ___] : surgeon: [unfilled] [Date of Surgery ___] : on [unfilled] [Lower Extremity Swelling] : lower extremity swelling [Cardiovascular Disease] : cardiovascular disease [Anti-Platelet Agents] : anti-platelet agents [GI Disease] : gastrointestinal disease [Thromboembolic Problems] : thromboembolic problems [Frequent Aspirin Use] : frequent aspirin use [Prior Anesthesia] : Prior anesthesia [Electrocardiogram] : ~T an ECG ~C was performed [Cardiac Catheterization  (Diagnostic)] : cardiac catheterization ~T ~C was performed [Echocardiogram] : ~T an echocardiogram ~C was performed [Cardiovascular Stress Test] : a cardiac stress test ~T ~C was performed [Good] : Good [Fever] : no fever [Chills] : no chills [Fatigue] : no fatigue [Chest Pain] : no chest pain [Cough] : no cough [Dyspnea] : no dyspnea [Dysuria] : no dysuria [Urinary Frequency] : no urinary frequency [Vomiting] : no vomiting [Nausea] : no nausea [Diarrhea] : no diarrhea [Easy Bruising] : no easy bruising [Abdominal Pain] : no abdominal pain [Poor Exercise Tolerance] : no poor exercise tolerance [Diabetes] : no diabetes [Pulmonary Disease] : no pulmonary disease [Nicotine Dependence] : no nicotine dependence [Alcohol Use] : no  alcohol use [Renal Disease] : no renal disease [Sleep Apnea] : no sleep apnea [Frequent use of NSAIDs] : no use of NSAIDs [Transfusion Reaction] : no transfusion reaction [Impaired Immunity] : no impaired immunity [Steroid Use in Last 6 Months] : no steroid use in the last six months [Prev Anesthesia Reaction] : no previous anesthesia reaction [Anesthesia Reaction] : no anesthesia reaction [Sudden Death] : no sudden death [Clotting Disorder] : no clotting disorder [Bleeding Disorder] : no bleeding disorder [FreeTextEntry1] : Mr. Mccoy was initially seen 6/26/08 with single vessel CAD;  D1 branch of LAD with moderate diffuse LV dysfunction, no PCI. In 2001, he underwent PPM (Medtronic [id number] VVI mode) for atrial fibrillation with slow ventricular response and pause up to 4 sec. There is also h/o HTN with mild orthostatic hypotension and microalbuminuria, HLD, former cigarette smoker, BPH, acquired polycythemia, thrombocytopenia, benign paroxysmal position vertigo, gallstones, glaucoma and hiatal hernia with GERD. On 6/1/08, he presented to ED at [facility] with chest pain and /120 mm Hg. He received IV nitroglycerin (or nitroprusside) and was discharged to home after undergoing stress test.  During office visit 8/19/11, there was intermittent substernal chest pain (squeezing with some burning) radiating to the left shoulder. There was frequently dizziness, weakness and insomnia. In June 2012, he was admitted to [facility] with left shoulder pain and  / 110-117 mm Hg, despite all medication. He was treated  and discharged after about 12 hours, with no work up for ischemia. In August 2012, he used doxazosin only when BP was elevated. Quinapril (Accupril) was stopped and substituted with Diovan. He stopped isosorbide mononitrate (Imdur). On Thanksgiving day (11/22/12) he developed sudden warmth and dizziness while driving [place] the [facility] [place]. He opened the window to get more air, made it across the bridge and then pulled over to the side of the road.  He began retching and vomiting, no LOC. There was no abdominal pain or heartburn. He was admitted to [facility] for 48 hours. After discharge, and at the time of office visit on 12/24/12, there were periods of dizziness and unsteadiness, but no severe nausea. He had occasional one-two ounce whiskey or cognac. He was not smoking. At the time of office visit on 12/13/13, he was not smoking and had no cravings. He drank one to two glasses wine or two ounces whisky / cognac, several days per week. He was used quinapril, not Diovan. 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 He underwent surgical resection 7/9/14. No additional treatment was required. He lost up to twenty pounds after surgery, but subsequently regained weight. There was occasional chest pain while walking, but pain resolved with continued walking. At the time of office  visit on 2/11/15, he noted discomfort from right inguinal hernia and small midline abdominal hernia.  At the time of office visit 2/10/16, his chief complaint was exertional shortness of breath. He was in the ED 10 days prior with nasal bleeding. INR ~2.0. He was hospitalized for one week in October 2016 for pneumonia. At the time of office visit 8/11/17, self obtained SBP frequently > 140 mm Hg. He underwent PPM generator change 3/11/19 for battery RYAN. He was diagnosed with acute cholecystitis with gallstones and after changing diet, there was decreased abdominal pain.  At the time of office visit  9/13/19, there were no new symptoms. He underwent laparoscopic cholecystectomy with simultaneous repair of small right periumbilical incisional hernia with Dr. Garcia Held 9/24/19. There were no complications. He felt well during visit 2/28/2020, with no new symptoms. By 9/1/2020, he had recurrent bilateral inguinal herniae, L > R and was to undergo repair 9/24/2020. There was no resting or exertional dyspnea, chest pain, lightheadedness or palpitations. He tolerated his regimen (which was carefully reconciled). He stopped rosuvastatin on his own in August, though not because of side effects. He did not take antihypertensive medication 8/31/2020.

## 2020-09-03 NOTE — DISCUSSION/SUMMARY
[Procedure Low Risk] : the procedure risk is low [Patient Intermediate Risk] : the patient is an intermediate risk [Optimized for Surgery] : the patient is optimized for surgery [As per surgery] : as per surgery [Continue] : Continue medications as currently directed [FreeTextEntry3] : amlodipine 5 mg daily, latanoprost 0.005% 1 drop left eye at bedtime, metoprolol tartrate 50 mg twice daily, quinapril 40 mg daily in morning, rosuvastatin 10 mg daily at bedtime and tamsulosin 0.4 mg daily at betime [FreeTextEntry1] : Aspirin should be discontinued 5 days prior to surgery. The last dose of apixaban (Eliquis) should be given two days before procedure (ie, skip two doses on the day before the procedure) as there is low bleeding risk. If high bleeding risk is suspected, give last dose three days before procedure (ie skip four doses on the two days before the procedure). If there is low postoperative bleed risk, resume Eliquis 24 hours after surgery (ie, postoperative day 1). If there is high post-operative bleeding risk, resume Eliquis 48 - 72 hours after surgery (ie, postoperative day 2 - 3). This patient does not take other antiplatelet agent or fish oil. He does not require antibiotic for SBE prophylaxis.

## 2020-09-03 NOTE — PHYSICAL EXAM
[Prolonged Exp Time] : with normal expiratory time [Increased E/I Ratio] : with normal expiratory/inspiratory ratio  [FreeTextEntry1] : ankle circumference 9.0 inches bilaterally; previously 9.0 - 9.25 in bilaterally [Apical Thrill] : no thrill palpable at the apex [S4] : no S4 [Click] : no click [Pericardial Rub] : no pericardial rub [Right Carotid Bruit] : no bruit heard over the right carotid [Left Carotid Bruit] : no bruit heard over the left carotid [Right Femoral Bruit] : no bruit heard over the right femoral artery [Left Femoral Bruit] : no bruit heard over the left femoral artery

## 2020-09-03 NOTE — ADDENDUM
[FreeTextEntry1] : I spent 60 minutes face to face time with the patient, from 11:00 to 12:00 on 99/1/2020 by telemedicine using [email or web address] (tm). Informed consent for telemedicine was obtained prior to the visit. Thirty minutes were devoted to patient counseling as outlined above in the End of Visit section.  Prior to this visit, I review office notes of Dr. Pierce and Dr. Tejada. Labs done 8/19/2020 with Dr. Baxter were obtained and reviewed.

## 2020-10-01 ENCOUNTER — OUTPATIENT (OUTPATIENT)
Dept: OUTPATIENT SERVICES | Facility: HOSPITAL | Age: 75
LOS: 1 days | Discharge: HOME | End: 2020-10-01
Payer: MEDICARE

## 2020-10-01 VITALS
OXYGEN SATURATION: 97 % | HEART RATE: 64 BPM | SYSTOLIC BLOOD PRESSURE: 124 MMHG | WEIGHT: 174.17 LBS | HEIGHT: 68 IN | DIASTOLIC BLOOD PRESSURE: 68 MMHG | TEMPERATURE: 97 F

## 2020-10-01 VITALS
RESPIRATION RATE: 18 BRPM | TEMPERATURE: 97 F | HEIGHT: 68 IN | SYSTOLIC BLOOD PRESSURE: 124 MMHG | WEIGHT: 174.17 LBS | DIASTOLIC BLOOD PRESSURE: 68 MMHG | HEART RATE: 61 BPM

## 2020-10-01 DIAGNOSIS — Z95.0 PRESENCE OF CARDIAC PACEMAKER: Chronic | ICD-10-CM

## 2020-10-01 DIAGNOSIS — Z85.038 PERSONAL HISTORY OF OTHER MALIGNANT NEOPLASM OF LARGE INTESTINE: Chronic | ICD-10-CM

## 2020-10-01 DIAGNOSIS — K40.20 BILATERAL INGUINAL HERNIA, WITHOUT OBSTRUCTION OR GANGRENE, NOT SPECIFIED AS RECURRENT: ICD-10-CM

## 2020-10-01 DIAGNOSIS — Z01.818 ENCOUNTER FOR OTHER PREPROCEDURAL EXAMINATION: ICD-10-CM

## 2020-10-01 LAB
ANION GAP SERPL CALC-SCNC: 6 MMOL/L — SIGNIFICANT CHANGE UP (ref 5–17)
BUN SERPL-MCNC: 17 MG/DL — SIGNIFICANT CHANGE UP (ref 7–23)
CALCIUM SERPL-MCNC: 9.3 MG/DL — SIGNIFICANT CHANGE UP (ref 8.5–10.1)
CHLORIDE SERPL-SCNC: 106 MMOL/L — SIGNIFICANT CHANGE UP (ref 96–108)
CO2 SERPL-SCNC: 26 MMOL/L — SIGNIFICANT CHANGE UP (ref 22–31)
CREAT SERPL-MCNC: 0.87 MG/DL — SIGNIFICANT CHANGE UP (ref 0.5–1.3)
GLUCOSE SERPL-MCNC: 85 MG/DL — SIGNIFICANT CHANGE UP (ref 70–99)
HCT VFR BLD CALC: 47.6 % — SIGNIFICANT CHANGE UP (ref 39–50)
HGB BLD-MCNC: 15.9 G/DL — SIGNIFICANT CHANGE UP (ref 13–17)
INR BLD: 1.66 RATIO — HIGH (ref 0.88–1.16)
MCHC RBC-ENTMCNC: 29.9 PG — SIGNIFICANT CHANGE UP (ref 27–34)
MCHC RBC-ENTMCNC: 33.4 GM/DL — SIGNIFICANT CHANGE UP (ref 32–36)
MCV RBC AUTO: 89.5 FL — SIGNIFICANT CHANGE UP (ref 80–100)
NRBC # BLD: 0 /100 WBCS — SIGNIFICANT CHANGE UP (ref 0–0)
PLATELET # BLD AUTO: 124 K/UL — LOW (ref 150–400)
POTASSIUM SERPL-MCNC: 4.1 MMOL/L — SIGNIFICANT CHANGE UP (ref 3.5–5.3)
POTASSIUM SERPL-SCNC: 4.1 MMOL/L — SIGNIFICANT CHANGE UP (ref 3.5–5.3)
PROTHROM AB SERPL-ACNC: 18.8 SEC — HIGH (ref 10.6–13.6)
RBC # BLD: 5.32 M/UL — SIGNIFICANT CHANGE UP (ref 4.2–5.8)
RBC # FLD: 13.8 % — SIGNIFICANT CHANGE UP (ref 10.3–14.5)
SODIUM SERPL-SCNC: 138 MMOL/L — SIGNIFICANT CHANGE UP (ref 135–145)
WBC # BLD: 6.29 K/UL — SIGNIFICANT CHANGE UP (ref 3.8–10.5)
WBC # FLD AUTO: 6.29 K/UL — SIGNIFICANT CHANGE UP (ref 3.8–10.5)

## 2020-10-01 PROCEDURE — 93010 ELECTROCARDIOGRAM REPORT: CPT

## 2020-10-01 RX ORDER — OMEPRAZOLE 10 MG/1
1 CAPSULE, DELAYED RELEASE ORAL
Qty: 0 | Refills: 0 | DISCHARGE

## 2020-10-01 RX ORDER — METOPROLOL TARTRATE 50 MG
1 TABLET ORAL
Qty: 0 | Refills: 0 | DISCHARGE

## 2020-10-01 RX ORDER — AMLODIPINE BESYLATE 2.5 MG/1
1 TABLET ORAL
Qty: 0 | Refills: 0 | DISCHARGE

## 2020-10-01 RX ORDER — QUINAPRIL HYDROCHLORIDE 40 MG/1
1 TABLET, FILM COATED ORAL
Qty: 0 | Refills: 0 | DISCHARGE

## 2020-10-01 RX ORDER — LATANOPROST 0.05 MG/ML
1 SOLUTION/ DROPS OPHTHALMIC; TOPICAL
Qty: 0 | Refills: 0 | DISCHARGE

## 2020-10-01 RX ORDER — APIXABAN 2.5 MG/1
1 TABLET, FILM COATED ORAL
Qty: 0 | Refills: 0 | DISCHARGE

## 2020-10-01 RX ORDER — MECLIZINE HCL 12.5 MG
1 TABLET ORAL
Qty: 0 | Refills: 0 | DISCHARGE

## 2020-10-01 RX ORDER — TAMSULOSIN HYDROCHLORIDE 0.4 MG/1
1 CAPSULE ORAL
Qty: 0 | Refills: 0 | DISCHARGE

## 2020-10-01 RX ORDER — ASPIRIN/CALCIUM CARB/MAGNESIUM 324 MG
1 TABLET ORAL
Qty: 0 | Refills: 0 | DISCHARGE

## 2020-10-01 NOTE — H&P PST ADULT - NEGATIVE GENERAL GENITOURINARY SYMPTOMS
no urine discoloration/no incontinence/no flank pain L/no flank pain R/no bladder infections/normal urinary frequency/no urinary hesitancy/no dysuria/no hematuria

## 2020-10-01 NOTE — H&P PST ADULT - NSICDXPASTSURGICALHX_GEN_ALL_CORE_FT
PAST SURGICAL HISTORY:  History of colon cancer History of Colon surgery-right hemicolectomy for cancer with a midline abdominal incision    Pacemaker [employer]   implant date: March 11, 2019  Serial # [id number], Model# [id number]

## 2020-10-01 NOTE — H&P PST ADULT - ASSESSMENT
Pre op diagnosis: Incisional hernia without obstruction or gangrene, Calculus of gallbladder without cholecystitis without obstruction. Patient is scheduled for Laparoscopic cholecystectomy, laparoscopic incisional hernia repair scheduled on 9/24/2019. Patient is a 74 year old male with PMH Pacemaker, Coronary Artery Disease, Colon Cancer, Afib. and recent lab carmela last year has increasing pain both groin for 2 months, Patient is scheduled for Laparoscopy Robotic assist bilateral inguinal hernia repair

## 2020-10-01 NOTE — H&P PST ADULT - NSICDXPASTMEDICALHX_GEN_ALL_CORE_FT
PAST MEDICAL HISTORY:  Atrial Fibrillation     CAD (coronary artery disease) Denies any stents    Calculus of gallbladder without cholecystitis without obstruction     Cardiac Pacemaker     Colon cancer     Dyslipidemia     GERD (Gastroesophageal Reflux Disease)     HTN (Hypertension)     Incisional hernia without obstruction or gangrene      PAST MEDICAL HISTORY:  Atrial Fibrillation     CAD (coronary artery disease) Denies any stents    Calculus of gallbladder without cholecystitis without obstruction     Cardiac Pacemaker     Colon cancer     Dyslipidemia     GERD (Gastroesophageal Reflux Disease)     Glaucoma     HTN (Hypertension)     Incisional hernia without obstruction or gangrene

## 2020-10-01 NOTE — H&P PST ADULT - NEGATIVE OPHTHALMOLOGIC SYMPTOMS
no photophobia/no blurred vision L/no pain R/no irritation L/no blurred vision R/no discharge R/no discharge L/no diplopia/no pain L/no irritation R

## 2020-10-01 NOTE — H&P PST ADULT - NSICDXPROBLEM_GEN_ALL_CORE_FT
PROBLEM DIAGNOSES  Problem: Incisional hernia without obstruction or gangrene  Assessment and Plan: Patient is scheduled for Laparoscopic cholecystectomy, laparoscopic incisional hernia repair scheduled on 9/24/2019.   Preop instructions, pepcid, surgical scrub provided. Pt stated understanding. Teach back method utilized.   Pending cardiology evaluation per surgeon and PST- History of Pacemaker, CAD, A- fib on Eliquis.   Eliquis plan: Per Cardiologist Dr. Kumar -Last dose of Eliquis is on 9/20/2019. Patient is made aware of the plan and verbalized understanding.       Problem: Snoring  Assessment and Plan: JOSHUA precautions, OR booking notified.      Problem: Hypertension  Assessment and Plan: Patient instructed to take Amlodipine and Quinapril in the AM of surgery with a sip of water.     Problem: Pacemaker  Assessment and Plan: Pacemaker [employer]   implant date: March 11, 2019  Serial # [id number], Model# [id number]  OR booking notified.

## 2020-10-01 NOTE — H&P PST ADULT - NEGATIVE ENMT SYMPTOMS
no hearing difficulty/no ear pain/no tinnitus/no nasal discharge/no dry mouth/no sinus symptoms/no nose bleeds/no recurrent cold sores/no abnormal taste sensation/no throat pain/no dysphagia/no nasal congestion/no nasal obstruction/no post-nasal discharge/no gum bleeding

## 2020-10-01 NOTE — H&P PST ADULT - REASON FOR ADMISSION
Pre op diagnosis: Incisional hernia without obstruction or gangrene  Calculus of gallbladder without cholecystitis without obstruction. "bilateral hernia"

## 2020-10-01 NOTE — H&P PST ADULT - NEGATIVE NEUROLOGICAL SYMPTOMS
no vertigo/no difficulty walking/no loss of consciousness/no loss of sensation/no headache/no confusion/no focal seizures/no weakness/no generalized seizures/no tremors/no transient paralysis

## 2020-10-01 NOTE — H&P PST ADULT - HISTORY OF PRESENT ILLNESS
Patient is a 74 year old male with a history of Intermittent right upper quadrant pain for the past 9 months per patient. Patient is s/p CT of the abdomen and Pelvis with abnormal results. Patient was referred  to Dr. Tejada for an evaluation. Pre op diagnosis: Incisional hernia without obstruction or gangrene, Calculus of gallbladder without cholecystitis without obstruction. Patient is scheduled for Laparoscopic cholecystectomy, laparoscopic incisional hernia repair scheduled on 9/24/2019.     (Patient is both English and [place] speaking, requesting to speak in English- Offered free interpretation services,  Patient refused. Requesting to speak in English.  ) Patient is a 74 year old male with PMH Pacemaker, Coronary Artery Disease, Colon Cancer, Afib. and recent lab carmela last year has increasing pain both groin for 2 months, Denies short of breath, cp, n/v/d fever, chills, cough. Patient is scheduled for Bilateral inguinal hernia repair.

## 2020-10-02 ENCOUNTER — APPOINTMENT (OUTPATIENT)
Dept: CARDIOLOGY | Facility: CLINIC | Age: 75
End: 2020-10-02
Payer: MEDICARE

## 2020-10-02 ENCOUNTER — NON-APPOINTMENT (OUTPATIENT)
Age: 75
End: 2020-10-02

## 2020-10-02 ENCOUNTER — APPOINTMENT (OUTPATIENT)
Dept: ELECTROPHYSIOLOGY | Facility: CLINIC | Age: 75
End: 2020-10-02
Payer: MEDICARE

## 2020-10-02 VITALS — SYSTOLIC BLOOD PRESSURE: 126 MMHG | DIASTOLIC BLOOD PRESSURE: 69 MMHG | OXYGEN SATURATION: 97 % | HEART RATE: 64 BPM

## 2020-10-02 VITALS — HEART RATE: 62 BPM | DIASTOLIC BLOOD PRESSURE: 67 MMHG | OXYGEN SATURATION: 97 % | SYSTOLIC BLOOD PRESSURE: 120 MMHG

## 2020-10-02 VITALS — DIASTOLIC BLOOD PRESSURE: 73 MMHG | OXYGEN SATURATION: 98 % | SYSTOLIC BLOOD PRESSURE: 127 MMHG | HEART RATE: 63 BPM

## 2020-10-02 VITALS — SYSTOLIC BLOOD PRESSURE: 135 MMHG | OXYGEN SATURATION: 97 % | HEART RATE: 65 BPM | DIASTOLIC BLOOD PRESSURE: 69 MMHG

## 2020-10-02 VITALS
BODY MASS INDEX: 28.09 KG/M2 | WEIGHT: 179 LBS | OXYGEN SATURATION: 99 % | TEMPERATURE: 97.9 F | DIASTOLIC BLOOD PRESSURE: 76 MMHG | SYSTOLIC BLOOD PRESSURE: 129 MMHG | HEIGHT: 67 IN | RESPIRATION RATE: 16 BRPM | HEART RATE: 68 BPM

## 2020-10-02 DIAGNOSIS — R80.9 PROTEINURIA, UNSPECIFIED: ICD-10-CM

## 2020-10-02 PROBLEM — H40.9 UNSPECIFIED GLAUCOMA: Chronic | Status: ACTIVE | Noted: 2020-10-01

## 2020-10-02 PROCEDURE — 93040 RHYTHM ECG WITH REPORT: CPT | Mod: 59

## 2020-10-02 PROCEDURE — 93000 ELECTROCARDIOGRAM COMPLETE: CPT

## 2020-10-02 PROCEDURE — 99215 OFFICE O/P EST HI 40 MIN: CPT

## 2020-10-02 PROCEDURE — 36415 COLL VENOUS BLD VENIPUNCTURE: CPT

## 2020-10-02 PROCEDURE — 93279 PRGRMG DEV EVAL PM/LDLS PM: CPT

## 2020-10-05 ENCOUNTER — APPOINTMENT (OUTPATIENT)
Dept: SURGERY | Facility: CLINIC | Age: 75
End: 2020-10-05
Payer: MEDICARE

## 2020-10-05 ENCOUNTER — OUTPATIENT (OUTPATIENT)
Dept: OUTPATIENT SERVICES | Facility: HOSPITAL | Age: 75
LOS: 1 days | End: 2020-10-05

## 2020-10-05 ENCOUNTER — APPOINTMENT (OUTPATIENT)
Dept: CV DIAGNOSTICS | Facility: HOSPITAL | Age: 75
End: 2020-10-05
Payer: MEDICARE

## 2020-10-05 VITALS
WEIGHT: 182 LBS | HEIGHT: 67 IN | TEMPERATURE: 98.1 F | BODY MASS INDEX: 28.56 KG/M2 | SYSTOLIC BLOOD PRESSURE: 129 MMHG | DIASTOLIC BLOOD PRESSURE: 82 MMHG | HEART RATE: 67 BPM

## 2020-10-05 DIAGNOSIS — R07.89 OTHER CHEST PAIN: ICD-10-CM

## 2020-10-05 DIAGNOSIS — Z95.0 PRESENCE OF CARDIAC PACEMAKER: Chronic | ICD-10-CM

## 2020-10-05 DIAGNOSIS — Z85.038 PERSONAL HISTORY OF OTHER MALIGNANT NEOPLASM OF LARGE INTESTINE: Chronic | ICD-10-CM

## 2020-10-05 DIAGNOSIS — R06.00 DYSPNEA, UNSPECIFIED: ICD-10-CM

## 2020-10-05 DIAGNOSIS — I48.19 OTHER PERSISTENT ATRIAL FIBRILLATION: ICD-10-CM

## 2020-10-05 DIAGNOSIS — Z01.810 ENCOUNTER FOR PREPROCEDURAL CARDIOVASCULAR EXAMINATION: ICD-10-CM

## 2020-10-05 DIAGNOSIS — E78.5 HYPERLIPIDEMIA, UNSPECIFIED: ICD-10-CM

## 2020-10-05 DIAGNOSIS — I25.10 ATHEROSCLEROTIC HEART DISEASE OF NATIVE CORONARY ARTERY WITHOUT ANGINA PECTORIS: ICD-10-CM

## 2020-10-05 LAB
25(OH)D3 SERPL-MCNC: 32.2 NG/ML
ALBUMIN SERPL ELPH-MCNC: 4.3 G/DL
ALP BLD-CCNC: 144 U/L
ALT SERPL-CCNC: 31 U/L
ANION GAP SERPL CALC-SCNC: 14 MMOL/L
APO LP(A) SERPL-MCNC: 37.5 NMOL/L
APPEARANCE: CLEAR
AST SERPL-CCNC: 26 U/L
BACTERIA: NEGATIVE
BASOPHILS # BLD AUTO: 0.05 K/UL
BASOPHILS NFR BLD AUTO: 1 %
BILIRUB SERPL-MCNC: 1.4 MG/DL
BILIRUBIN URINE: NEGATIVE
BLOOD URINE: NORMAL
BUN SERPL-MCNC: 18 MG/DL
CALCIUM SERPL-MCNC: 9.7 MG/DL
CHLORIDE SERPL-SCNC: 101 MMOL/L
CHOLEST SERPL-MCNC: 148 MG/DL
CHOLEST/HDLC SERPL: 3.4 RATIO
CO2 SERPL-SCNC: 25 MMOL/L
COLOR: YELLOW
CREAT SERPL-MCNC: 0.96 MG/DL
CREAT SPEC-SCNC: 194 MG/DL
CRP SERPL HS-MCNC: 1.57 MG/L
EOSINOPHIL # BLD AUTO: 0.22 K/UL
EOSINOPHIL NFR BLD AUTO: 4.5 %
ESTIMATED AVERAGE GLUCOSE: 111 MG/DL
GLUCOSE QUALITATIVE U: NEGATIVE
GLUCOSE SERPL-MCNC: 50 MG/DL
HBA1C MFR BLD HPLC: 5.5 %
HCT VFR BLD CALC: 48.1 %
HDLC SERPL-MCNC: 43 MG/DL
HGB BLD-MCNC: 15.8 G/DL
HYALINE CASTS: 0 /LPF
IMM GRANULOCYTES NFR BLD AUTO: 0.4 %
KETONES URINE: NEGATIVE
LDLC SERPL CALC-MCNC: 81 MG/DL
LDLC SERPL DIRECT ASSAY-MCNC: 92 MG/DL
LEUKOCYTE ESTERASE URINE: NEGATIVE
LYMPHOCYTES # BLD AUTO: 1.34 K/UL
LYMPHOCYTES NFR BLD AUTO: 27.5 %
MAGNESIUM SERPL-MCNC: 2.2 MG/DL
MAN DIFF?: NORMAL
MCHC RBC-ENTMCNC: 30.7 PG
MCHC RBC-ENTMCNC: 32.8 GM/DL
MCV RBC AUTO: 93.4 FL
MICROALBUMIN 24H UR DL<=1MG/L-MCNC: 1.3 MG/DL
MICROALBUMIN/CREAT 24H UR-RTO: 7 MG/G
MICROSCOPIC-UA: NORMAL
MONOCYTES # BLD AUTO: 0.36 K/UL
MONOCYTES NFR BLD AUTO: 7.4 %
NEUTROPHILS # BLD AUTO: 2.88 K/UL
NEUTROPHILS NFR BLD AUTO: 59.2 %
NITRITE URINE: NEGATIVE
PH URINE: 6
PLATELET # BLD AUTO: 128 K/UL
POTASSIUM SERPL-SCNC: 4.7 MMOL/L
PROT SERPL-MCNC: 7.2 G/DL
PROTEIN URINE: NORMAL
RBC # BLD: 5.15 M/UL
RBC # FLD: 13.8 %
RED BLOOD CELLS URINE: 3 /HPF
SODIUM ?TM SUB UR QN: 35 MMOL/L
SODIUM SERPL-SCNC: 141 MMOL/L
SPECIFIC GRAVITY URINE: 1.02
SQUAMOUS EPITHELIAL CELLS: 1 /HPF
T4 FREE SERPL-MCNC: 1.5 NG/DL
TRIGL SERPL-MCNC: 121 MG/DL
TSH SERPL-ACNC: 1.4 UIU/ML
UROBILINOGEN URINE: ABNORMAL
WBC # FLD AUTO: 4.87 K/UL
WHITE BLOOD CELLS URINE: 1 /HPF

## 2020-10-05 PROCEDURE — 99024 POSTOP FOLLOW-UP VISIT: CPT

## 2020-10-05 PROCEDURE — 93016 CV STRESS TEST SUPVJ ONLY: CPT | Mod: GC

## 2020-10-05 PROCEDURE — 78452 HT MUSCLE IMAGE SPECT MULT: CPT | Mod: 26

## 2020-10-05 PROCEDURE — 93018 CV STRESS TEST I&R ONLY: CPT | Mod: GC

## 2020-10-07 ENCOUNTER — APPOINTMENT (OUTPATIENT)
Dept: CV DIAGNOSITCS | Facility: HOSPITAL | Age: 75
End: 2020-10-07
Payer: MEDICARE

## 2020-10-07 ENCOUNTER — OUTPATIENT (OUTPATIENT)
Dept: OUTPATIENT SERVICES | Facility: HOSPITAL | Age: 75
LOS: 1 days | End: 2020-10-07

## 2020-10-07 DIAGNOSIS — Z85.038 PERSONAL HISTORY OF OTHER MALIGNANT NEOPLASM OF LARGE INTESTINE: Chronic | ICD-10-CM

## 2020-10-07 DIAGNOSIS — I48.19 OTHER PERSISTENT ATRIAL FIBRILLATION: ICD-10-CM

## 2020-10-07 DIAGNOSIS — Z95.0 PRESENCE OF CARDIAC PACEMAKER: Chronic | ICD-10-CM

## 2020-10-07 PROCEDURE — 93306 TTE W/DOPPLER COMPLETE: CPT | Mod: 26

## 2020-10-08 NOTE — ADDENDUM
[FreeTextEntry1] : I spent 60 minutes face to face time with the patient, from 11:00 to 12:00 on 10/2//2020. Thirty minutes were devoted to patient counseling as outlined above in the End of Visit section.  Prior to this visit, I review office notes of Dr. Pierce and Dr. Tejada. After the visit, I personally reviewed images and findings of nuclear stress test. I reviewed images of echocardiogram with Dr. Felicia Kumar, performing  (interpreting) physician.

## 2020-10-08 NOTE — HISTORY OF PRESENT ILLNESS
[Preoperative Visit] : for a medical evaluation prior to surgery [Scheduled Procedure ___] : a [unfilled] [Date of Surgery ___] : on [unfilled] [Surgeon Name ___] : surgeon: [unfilled] [Lower Extremity Swelling] : lower extremity swelling [Cardiovascular Disease] : cardiovascular disease [Anti-Platelet Agents] : anti-platelet agents [GI Disease] : gastrointestinal disease [Thromboembolic Problems] : thromboembolic problems [Frequent Aspirin Use] : frequent aspirin use [Prior Anesthesia] : Prior anesthesia [Electrocardiogram] : ~T an ECG ~C was performed [Echocardiogram] : ~T an echocardiogram ~C was performed [Cardiac Catheterization  (Diagnostic)] : cardiac catheterization ~T ~C was performed [Cardiovascular Stress Test] : a cardiac stress test ~T ~C was performed [Good] : Good [Fever] : no fever [Chills] : no chills [Fatigue] : no fatigue [Chest Pain] : no chest pain [Cough] : no cough [Dyspnea] : no dyspnea [Dysuria] : no dysuria [Urinary Frequency] : no urinary frequency [Nausea] : no nausea [Vomiting] : no vomiting [Diarrhea] : no diarrhea [Abdominal Pain] : no abdominal pain [Easy Bruising] : no easy bruising [Poor Exercise Tolerance] : no poor exercise tolerance [Diabetes] : no diabetes [Pulmonary Disease] : no pulmonary disease [Nicotine Dependence] : no nicotine dependence [Alcohol Use] : no  alcohol use [Renal Disease] : no renal disease [Sleep Apnea] : no sleep apnea [Frequent use of NSAIDs] : no use of NSAIDs [Transfusion Reaction] : no transfusion reaction [Impaired Immunity] : no impaired immunity [Steroid Use in Last 6 Months] : no steroid use in the last six months [Prev Anesthesia Reaction] : no previous anesthesia reaction [Anesthesia Reaction] : no anesthesia reaction [Sudden Death] : no sudden death [Clotting Disorder] : no clotting disorder [Bleeding Disorder] : no bleeding disorder [FreeTextEntry1] : Mr. Mccoy was initially seen 6/26/08 with single vessel CAD;  D1 branch of LAD with moderate diffuse LV dysfunction, no PCI. In 2001, he underwent PPM (Medtronic WOS659 VVI mode) for atrial fibrillation with slow ventricular response and pause up to 4 sec. There is also h/o HTN with mild orthostatic hypotension and microalbuminuria, HLD, former cigarette smoker, BPH, acquired polycythemia, thrombocytopenia, benign paroxysmal position vertigo, gallstones, glaucoma and hiatal hernia with GERD. On 6/1/08, he presented to ED at [facility] with chest pain and /120 mm Hg. He received IV nitroglycerin (or nitroprusside) and was discharged to home after undergoing stress test.  During office visit 8/19/11, there was intermittent substernal chest pain (squeezing with some burning) radiating to the left shoulder. There was frequently dizziness, weakness and insomnia. In June 2012, he was admitted to [facility] with left shoulder pain and  / 110-117 mm Hg, despite all medication. He was treated  and discharged after about 12 hours, with no work up for ischemia. In August 2012, he used doxazosin only when BP was elevated. Quinapril (Accupril) was stopped and substituted with Diovan. He stopped isosorbide mononitrate (Imdur). On Thanksgiving day (11/22/12) he developed sudden warmth and dizziness while driving across the [facility] [place]. He opened the window to get more air, made it across the bridge and then pulled over to the side of the road.  He began retching and vomiting, no LOC. There was no abdominal pain or heartburn. He was admitted to [facility] for 48 hours. After discharge, and at the time of office visit on 12/24/12, there were periods of dizziness and unsteadiness, but no severe nausea. He had occasional one-two ounce whiskey or cognac. He was not smoking. At the time of office visit on 12/13/13, he was not smoking and had no cravings. He drank one to two glasses wine or two ounces whisky / cognac, several days per week. He was used quinapril, not Diovan. 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 He underwent surgical resection 7/9/14. No additional treatment was required. He lost up to twenty pounds after surgery, but subsequently regained weight. There was occasional chest pain while walking, but pain resolved with continued walking. At the time of office  visit on 2/11/15, he noted discomfort from right inguinal hernia and small midline abdominal hernia.  At the time of office visit 2/10/16, his chief complaint was exertional shortness of breath. He was in the ED 10 days prior with nasal bleeding. INR ~2.0. He was hospitalized for one week in October 2016 for pneumonia. At the time of office visit 8/11/17, self obtained SBP frequently > 140 mm Hg. He underwent PPM generator change 3/11/19 for battery RYAN. He was diagnosed with acute cholecystitis with gallstones and after changing diet, there was decreased abdominal pain.  At the time of office visit  9/13/19, there were no new symptoms. He underwent laparoscopic cholecystectomy with simultaneous repair of small right periumbilical incisional hernia with Dr. Garcia Held 9/24/19. There were no complications. He felt well during visit 2/28/2020, with no new symptoms. By 9/1/2020, he had recurrent bilateral inguinal herniae, L > R and was to undergo repair 9/24/2020. There was no resting or exertional dyspnea, chest pain, lightheadedness or palpitations. He tolerated his regimen (which was carefully reconciled). He stopped rosuvastatin on his own in August, though not because of side effects. He did not take antihypertensive medication 8/31/2020.

## 2020-10-08 NOTE — DISCUSSION/SUMMARY
[Procedure Low Risk] : the procedure risk is low [Patient Intermediate Risk] : the patient is an intermediate risk [Optimized for Surgery] : the patient is optimized for surgery [As per surgery] : as per surgery [Continue] : Continue medications as currently directed [Patient] : the patient [Minutes spent___] : for [unfilled] ~Uminutes [___ Month(s)] : [unfilled] month(s) [With Me] : with me [FreeTextEntry3] : amlodipine 5 mg daily, latanoprost 0.005% 1 drop left eye at bedtime, metoprolol tartrate 50 mg twice daily, quinapril 40 mg daily in morning, rosuvastatin 10 mg daily at bedtime and tamsulosin 0.4 mg daily at betime [FreeTextEntry1] : Aspirin should be discontinued 5 days prior to surgery. The last dose of apixaban (Eliquis) should be given two days before procedure (ie, skip two doses on the day before the procedure) as there is low bleeding risk. If high bleeding risk is suspected, give last dose three days before procedure (ie skip four doses on the two days before the procedure). If there is low postoperative bleed risk, resume Eliquis 24 hours after surgery (ie, postoperative day 1). If there is high post-operative bleeding risk, resume Eliquis 48 - 72 hours after surgery (ie, postoperative day 2 - 3). This patient does not take other antiplatelet agent or fish oil. He does not require antibiotic for SBE prophylaxis.

## 2020-10-12 ENCOUNTER — APPOINTMENT (OUTPATIENT)
Dept: DISASTER EMERGENCY | Facility: CLINIC | Age: 75
End: 2020-10-12

## 2020-10-13 LAB — SARS-COV-2 N GENE NPH QL NAA+PROBE: NOT DETECTED

## 2020-10-14 ENCOUNTER — TRANSCRIPTION ENCOUNTER (OUTPATIENT)
Age: 75
End: 2020-10-14

## 2020-10-15 ENCOUNTER — APPOINTMENT (OUTPATIENT)
Dept: SURGERY | Facility: HOSPITAL | Age: 75
End: 2020-10-15

## 2020-10-15 ENCOUNTER — OUTPATIENT (OUTPATIENT)
Dept: OUTPATIENT SERVICES | Facility: HOSPITAL | Age: 75
LOS: 1 days | Discharge: HOME | End: 2020-10-15
Payer: MEDICARE

## 2020-10-15 VITALS
SYSTOLIC BLOOD PRESSURE: 124 MMHG | DIASTOLIC BLOOD PRESSURE: 74 MMHG | HEIGHT: 70 IN | RESPIRATION RATE: 16 BRPM | TEMPERATURE: 98 F | HEART RATE: 61 BPM | WEIGHT: 181 LBS | OXYGEN SATURATION: 98 %

## 2020-10-15 VITALS
OXYGEN SATURATION: 99 % | HEART RATE: 66 BPM | DIASTOLIC BLOOD PRESSURE: 70 MMHG | RESPIRATION RATE: 15 BRPM | SYSTOLIC BLOOD PRESSURE: 132 MMHG

## 2020-10-15 DIAGNOSIS — Z85.038 PERSONAL HISTORY OF OTHER MALIGNANT NEOPLASM OF LARGE INTESTINE: Chronic | ICD-10-CM

## 2020-10-15 DIAGNOSIS — Z95.0 PRESENCE OF CARDIAC PACEMAKER: Chronic | ICD-10-CM

## 2020-10-15 PROCEDURE — 49505 PRP I/HERN INIT REDUC >5 YR: CPT | Mod: AS

## 2020-10-15 PROCEDURE — 49505 PRP I/HERN INIT REDUC >5 YR: CPT

## 2020-10-15 RX ORDER — SODIUM CHLORIDE 9 MG/ML
3 INJECTION INTRAMUSCULAR; INTRAVENOUS; SUBCUTANEOUS EVERY 8 HOURS
Refills: 0 | Status: DISCONTINUED | OUTPATIENT
Start: 2020-10-15 | End: 2020-10-15

## 2020-10-15 RX ORDER — SODIUM CHLORIDE 9 MG/ML
1000 INJECTION, SOLUTION INTRAVENOUS
Refills: 0 | Status: DISCONTINUED | OUTPATIENT
Start: 2020-10-15 | End: 2020-10-15

## 2020-10-15 RX ORDER — FENTANYL CITRATE 50 UG/ML
25 INJECTION INTRAVENOUS
Refills: 0 | Status: DISCONTINUED | OUTPATIENT
Start: 2020-10-15 | End: 2020-10-15

## 2020-10-15 RX ADMIN — SODIUM CHLORIDE 75 MILLILITER(S): 9 INJECTION, SOLUTION INTRAVENOUS at 13:34

## 2020-10-15 RX ADMIN — FENTANYL CITRATE 25 MICROGRAM(S): 50 INJECTION INTRAVENOUS at 14:44

## 2020-10-15 RX ADMIN — FENTANYL CITRATE 25 MICROGRAM(S): 50 INJECTION INTRAVENOUS at 14:29

## 2020-10-15 RX ADMIN — FENTANYL CITRATE 25 MICROGRAM(S): 50 INJECTION INTRAVENOUS at 14:16

## 2020-10-15 RX ADMIN — FENTANYL CITRATE 25 MICROGRAM(S): 50 INJECTION INTRAVENOUS at 14:01

## 2020-10-15 NOTE — BRIEF OPERATIVE NOTE - NSICDXBRIEFPROCEDURE_GEN_ALL_CORE_FT
PROCEDURES:  Repair, hernia, inguinal, open, using mesh, adult 15-Oct-2020 13:31:29  Daniel Perera

## 2020-10-15 NOTE — ASU DISCHARGE PLAN (ADULT/PEDIATRIC) - CALL YOUR DOCTOR IF YOU HAVE ANY OF THE FOLLOWING:
Unable to urinate/Bleeding that does not stop/Wound/Surgical Site with redness, or foul smelling discharge or pus/Swelling that gets worse/Fever greater than (need to indicate Fahrenheit or Celsius)/Nausea and vomiting that does not stop

## 2020-10-15 NOTE — ASU PATIENT PROFILE, ADULT - PMH
Atrial Fibrillation    CAD (coronary artery disease)  Denies any stents  Calculus of gallbladder without cholecystitis without obstruction    Cardiac Pacemaker    Colon cancer    Dyslipidemia    GERD (Gastroesophageal Reflux Disease)    Glaucoma    HTN (Hypertension)    Incisional hernia without obstruction or gangrene

## 2020-10-20 DIAGNOSIS — Z79.82 LONG TERM (CURRENT) USE OF ASPIRIN: ICD-10-CM

## 2020-10-20 DIAGNOSIS — I25.10 ATHEROSCLEROTIC HEART DISEASE OF NATIVE CORONARY ARTERY WITHOUT ANGINA PECTORIS: ICD-10-CM

## 2020-10-20 DIAGNOSIS — K40.90 UNILATERAL INGUINAL HERNIA, WITHOUT OBSTRUCTION OR GANGRENE, NOT SPECIFIED AS RECURRENT: ICD-10-CM

## 2020-10-20 DIAGNOSIS — I27.20 PULMONARY HYPERTENSION, UNSPECIFIED: ICD-10-CM

## 2020-10-20 DIAGNOSIS — Z79.01 LONG TERM (CURRENT) USE OF ANTICOAGULANTS: ICD-10-CM

## 2020-10-20 DIAGNOSIS — I10 ESSENTIAL (PRIMARY) HYPERTENSION: ICD-10-CM

## 2020-10-20 DIAGNOSIS — Z95.0 PRESENCE OF CARDIAC PACEMAKER: ICD-10-CM

## 2020-10-20 DIAGNOSIS — I49.9 CARDIAC ARRHYTHMIA, UNSPECIFIED: ICD-10-CM

## 2020-10-26 ENCOUNTER — APPOINTMENT (OUTPATIENT)
Dept: SURGERY | Facility: CLINIC | Age: 75
End: 2020-10-26
Payer: MEDICARE

## 2020-10-26 VITALS
BODY MASS INDEX: 28.56 KG/M2 | DIASTOLIC BLOOD PRESSURE: 77 MMHG | HEIGHT: 67 IN | SYSTOLIC BLOOD PRESSURE: 121 MMHG | HEART RATE: 66 BPM | TEMPERATURE: 97.9 F | WEIGHT: 182 LBS

## 2020-10-26 PROCEDURE — 99024 POSTOP FOLLOW-UP VISIT: CPT

## 2020-11-16 ENCOUNTER — APPOINTMENT (OUTPATIENT)
Dept: SURGERY | Facility: CLINIC | Age: 75
End: 2020-11-16

## 2020-12-14 NOTE — ASU PATIENT PROFILE, ADULT - ABILITY TO HEAR (WITH HEARING AID OR HEARING APPLIANCE IF NORMALLY USED):
Routing to provider to review and advise, see MyC messages below.  Are you comfortable with or do you agree with patient still attending visit she has this afternoon with you? Patient notes she is feeling better today, not experiencing symptoms she was yesterday, vital signs appear normal.    Caro Carrington RN  [facility]       Mildly to Moderately Impaired: difficulty hearing in some environments or speaker may need to increase volume or speak distinctly/Left  [place]

## 2021-01-06 ENCOUNTER — APPOINTMENT (OUTPATIENT)
Dept: ELECTROPHYSIOLOGY | Facility: CLINIC | Age: 76
End: 2021-01-06
Payer: MEDICARE

## 2021-01-06 PROCEDURE — 93296 REM INTERROG EVL PM/IDS: CPT

## 2021-01-06 PROCEDURE — 93294 REM INTERROG EVL PM/LDLS PM: CPT

## 2021-01-13 ENCOUNTER — TRANSCRIPTION ENCOUNTER (OUTPATIENT)
Age: 76
End: 2021-01-13

## 2021-02-23 ENCOUNTER — NON-APPOINTMENT (OUTPATIENT)
Age: 76
End: 2021-02-23

## 2021-02-23 DIAGNOSIS — K21.00 GASTRO-ESOPHAGEAL REFLUX DISEASE WITH ESOPHAGITIS, WITHOUT BLEEDING: ICD-10-CM

## 2021-03-08 ENCOUNTER — NON-APPOINTMENT (OUTPATIENT)
Age: 76
End: 2021-03-08

## 2021-03-17 ENCOUNTER — APPOINTMENT (OUTPATIENT)
Dept: CV DIAGNOSITCS | Facility: HOSPITAL | Age: 76
End: 2021-03-17

## 2021-03-17 ENCOUNTER — OUTPATIENT (OUTPATIENT)
Dept: OUTPATIENT SERVICES | Facility: HOSPITAL | Age: 76
LOS: 1 days | End: 2021-03-17
Payer: MEDICARE

## 2021-03-17 DIAGNOSIS — Z85.038 PERSONAL HISTORY OF OTHER MALIGNANT NEOPLASM OF LARGE INTESTINE: Chronic | ICD-10-CM

## 2021-03-17 DIAGNOSIS — I25.5 ISCHEMIC CARDIOMYOPATHY: ICD-10-CM

## 2021-03-17 DIAGNOSIS — Z95.0 PRESENCE OF CARDIAC PACEMAKER: Chronic | ICD-10-CM

## 2021-03-17 PROCEDURE — 93306 TTE W/DOPPLER COMPLETE: CPT | Mod: 26

## 2021-03-18 DIAGNOSIS — J43.2 CENTRILOBULAR EMPHYSEMA: ICD-10-CM

## 2021-03-18 DIAGNOSIS — J44.9 CHRONIC OBSTRUCTIVE PULMONARY DISEASE, UNSPECIFIED: ICD-10-CM

## 2021-03-18 DIAGNOSIS — J47.9 BRONCHIECTASIS, UNCOMPLICATED: ICD-10-CM

## 2021-03-31 ENCOUNTER — APPOINTMENT (OUTPATIENT)
Dept: CARDIOLOGY | Facility: CLINIC | Age: 76
End: 2021-03-31
Payer: MEDICARE

## 2021-03-31 ENCOUNTER — NON-APPOINTMENT (OUTPATIENT)
Age: 76
End: 2021-03-31

## 2021-03-31 VITALS — SYSTOLIC BLOOD PRESSURE: 115 MMHG | DIASTOLIC BLOOD PRESSURE: 69 MMHG | HEART RATE: 70 BPM | OXYGEN SATURATION: 99 %

## 2021-03-31 VITALS — DIASTOLIC BLOOD PRESSURE: 74 MMHG | SYSTOLIC BLOOD PRESSURE: 127 MMHG | HEART RATE: 80 BPM | OXYGEN SATURATION: 100 %

## 2021-03-31 VITALS
BODY MASS INDEX: 28.09 KG/M2 | RESPIRATION RATE: 16 BRPM | HEART RATE: 86 BPM | HEIGHT: 67 IN | SYSTOLIC BLOOD PRESSURE: 122 MMHG | DIASTOLIC BLOOD PRESSURE: 76 MMHG | TEMPERATURE: 97.5 F | WEIGHT: 179 LBS | OXYGEN SATURATION: 99 %

## 2021-03-31 VITALS — SYSTOLIC BLOOD PRESSURE: 117 MMHG | DIASTOLIC BLOOD PRESSURE: 69 MMHG | HEART RATE: 68 BPM | OXYGEN SATURATION: 99 %

## 2021-03-31 DIAGNOSIS — J90 PLEURAL EFFUSION, NOT ELSEWHERE CLASSIFIED: ICD-10-CM

## 2021-03-31 PROCEDURE — 36415 COLL VENOUS BLD VENIPUNCTURE: CPT

## 2021-03-31 PROCEDURE — 93000 ELECTROCARDIOGRAM COMPLETE: CPT

## 2021-03-31 PROCEDURE — 99215 OFFICE O/P EST HI 40 MIN: CPT

## 2021-03-31 PROCEDURE — 93040 RHYTHM ECG WITH REPORT: CPT | Mod: 59

## 2021-03-31 NOTE — DISCUSSION/SUMMARY
[Patient] : the patient [Minutes spent___] : for [unfilled] ~Uminutes [___ Month(s)] : [unfilled] month(s) [With Me] : with me [Procedure Low Risk] : the procedure risk is low [Patient Intermediate Risk] : the patient is an intermediate risk [Optimized for Surgery] : the patient is optimized for surgery [As per surgery] : as per surgery [Continue] : Continue medications as currently directed [FreeTextEntry3] : amlodipine 5 mg daily, latanoprost 0.005% 1 drop left eye at bedtime, metoprolol tartrate 50 mg twice daily, quinapril 40 mg daily in morning, rosuvastatin 10 mg daily at bedtime and tamsulosin 0.4 mg daily at betime

## 2021-03-31 NOTE — REASON FOR VISIT
[Follow-Up - Clinic] : a clinic follow-up of [Atrial Fibrillation] : atrial fibrillation [Chest Pain] : chest pain [Coronary Artery Disease] : coronary artery disease [Hyperlipidemia] : hyperlipidemia [Dyspnea] : dyspnea

## 2021-04-01 LAB
25(OH)D3 SERPL-MCNC: 29.2 NG/ML
ALBUMIN SERPL ELPH-MCNC: 4.4 G/DL
ALP BLD-CCNC: 131 U/L
ALT SERPL-CCNC: 12 U/L
ANION GAP SERPL CALC-SCNC: 15 MMOL/L
AST SERPL-CCNC: 19 U/L
BILIRUB SERPL-MCNC: 1.7 MG/DL
BUN SERPL-MCNC: 27 MG/DL
CALCIUM SERPL-MCNC: 9.6 MG/DL
CHLORIDE SERPL-SCNC: 102 MMOL/L
CHOLEST SERPL-MCNC: 114 MG/DL
CO2 SERPL-SCNC: 24 MMOL/L
CREAT SERPL-MCNC: 0.87 MG/DL
GLUCOSE SERPL-MCNC: 38 MG/DL
HDLC SERPL-MCNC: 42 MG/DL
LDLC SERPL CALC-MCNC: 61 MG/DL
LDLC SERPL DIRECT ASSAY-MCNC: 65 MG/DL
MAGNESIUM SERPL-MCNC: 2.3 MG/DL
NONHDLC SERPL-MCNC: 72 MG/DL
NT-PROBNP SERPL-MCNC: 2071 PG/ML
POTASSIUM SERPL-SCNC: 4.2 MMOL/L
PROT SERPL-MCNC: 7.2 G/DL
SODIUM SERPL-SCNC: 141 MMOL/L
TRIGL SERPL-MCNC: 54 MG/DL
TSH SERPL-ACNC: 1.5 UIU/ML
VIT B12 SERPL-MCNC: 830 PG/ML

## 2021-04-01 NOTE — ADDENDUM
[FreeTextEntry1] : I spent 60 minutes face to face time with the patient, from 10:00 to 11:00 on 3/31/21. Thirty minutes were devoted to patient counseling as outlined above in the End of Visit section.  Prior to this visit, I review office notes of Dr. Pierce and Dr. Tejada. After the visit, I personally reviewed images  of echocardiogram 3/17/21 with Dr. Jorge Mitchell, performing  (interpreting) physician and compared to images from study done in October 2020.

## 2021-04-01 NOTE — PHYSICAL EXAM
[General Appearance - Well Developed] : well developed [Normal Appearance] : normal appearance [Well Groomed] : well groomed [General Appearance - Well Nourished] : well nourished [No Deformities] : no deformities [General Appearance - In No Acute Distress] : no acute distress [Normal Conjunctiva] : the conjunctiva exhibited no abnormalities [Eyelids - No Xanthelasma] : the eyelids demonstrated no xanthelasmas [Normal Oral Mucosa] : normal oral mucosa [No Oral Pallor] : no oral pallor [No Oral Cyanosis] : no oral cyanosis [Normal Jugular Venous A Waves Present] : normal jugular venous A waves present [Normal Jugular Venous V Waves Present] : normal jugular venous V waves present [No Jugular Venous [place] A Waves] : no jugular venous burgos A waves [Respiration, Rhythm And Depth] : normal respiratory rhythm and effort [Exaggerated Use Of Accessory Muscles For Inspiration] : no accessory muscle use [Auscultation Breath Sounds / Voice Sounds] : lungs were clear to auscultation bilaterally [Scattered Wheezes] : scattered wheezing [Abdomen Soft] : soft [Abdomen Tenderness] : non-tender [Abdomen Mass (___ Cm)] : no abdominal mass palpated [Abnormal Walk] : normal gait [Gait - Sufficient For Exercise Testing] : the gait was sufficient for exercise testing [Nail Clubbing] : no clubbing of the fingernails [Cyanosis, Localized] : no localized cyanosis [Petechial Hemorrhages (___cm)] : no petechial hemorrhages [Skin Color & Pigmentation] : normal skin color and pigmentation [] : no rash [No Venous Stasis] : no venous stasis [No Skin Ulcers] : no skin ulcer [No Xanthoma] : no  xanthoma was observed [Smooth] : with smooth borders [Multiple Papules] : multiple [Black] : black [Brown] : brown [Multiple] : multiple [Oriented To Time, Place, And Person] : oriented to person, place, and time [Affect] : the affect was normal [Mood] : the mood was normal [No Anxiety] : not feeling anxious [5th Left ICS - MCL] : palpated at the 5th LICS in the midclavicular line [Diffuse] : diffuse [No Precordial Heave] : no precordial heave was noted [Normal Rate] : normal [Rhythm Regular] : regular [Normal S1] : normal S1 [No Murmur] : no murmurs heard [2+] : left 2+ [No Abnormalities] : the abdominal aorta was not enlarged and no bruit was heard [No Pitting Edema] : no pitting edema present [Rt] : varicose veins of the right leg noted [Lt] : varicose veins of the left leg noted [Prolonged Exp Time] : with normal expiratory time [Increased E/I Ratio] : with normal expiratory/inspiratory ratio  [FreeTextEntry1] : left ankle circumf 9.5"; right ankle 10.0"  (previously 9.0 - 9.25 in bilat). [place] R mid calf circumf 14" with no pain to deep palpation [Apical Thrill] : no thrill palpable at the apex [S4] : no S4 [Click] : no click [Pericardial Rub] : no pericardial rub [Right Carotid Bruit] : no bruit heard over the right carotid [Left Carotid Bruit] : no bruit heard over the left carotid [Right Femoral Bruit] : no bruit heard over the right femoral artery [Left Femoral Bruit] : no bruit heard over the left femoral artery

## 2021-04-01 NOTE — HISTORY OF PRESENT ILLNESS
[Preoperative Visit] : for a medical evaluation prior to surgery [Scheduled Procedure ___] : a [unfilled] [Date of Surgery ___] : on [unfilled] [Surgeon Name ___] : surgeon: [unfilled] [Lower Extremity Swelling] : lower extremity swelling [Cardiovascular Disease] : cardiovascular disease [Anti-Platelet Agents] : anti-platelet agents [GI Disease] : gastrointestinal disease [Thromboembolic Problems] : thromboembolic problems [Frequent Aspirin Use] : frequent aspirin use [Prior Anesthesia] : Prior anesthesia [Electrocardiogram] : ~T an ECG ~C was performed [Echocardiogram] : ~T an echocardiogram ~C was performed [Cardiac Catheterization  (Diagnostic)] : cardiac catheterization ~T ~C was performed [Cardiovascular Stress Test] : a cardiac stress test ~T ~C was performed [Good] : Good [Fever] : no fever [Chills] : no chills [Fatigue] : no fatigue [Chest Pain] : no chest pain [Cough] : no cough [Dyspnea] : no dyspnea [Dysuria] : no dysuria [Urinary Frequency] : no urinary frequency [Nausea] : no nausea [Vomiting] : no vomiting [Diarrhea] : no diarrhea [Abdominal Pain] : no abdominal pain [Easy Bruising] : no easy bruising [Poor Exercise Tolerance] : no poor exercise tolerance [Diabetes] : no diabetes [Pulmonary Disease] : no pulmonary disease [Nicotine Dependence] : no nicotine dependence [Alcohol Use] : no  alcohol use [Renal Disease] : no renal disease [Sleep Apnea] : no sleep apnea [Frequent use of NSAIDs] : no use of NSAIDs [Transfusion Reaction] : no transfusion reaction [Impaired Immunity] : no impaired immunity [Steroid Use in Last 6 Months] : no steroid use in the last six months [Prev Anesthesia Reaction] : no previous anesthesia reaction [Anesthesia Reaction] : no anesthesia reaction [Sudden Death] : no sudden death [Clotting Disorder] : no clotting disorder [Bleeding Disorder] : no bleeding disorder [FreeTextEntry1] : Mr. Mccoy has h/o single vessel CAD with moderate diffuse LV dysfunction, PPM 2001 (Medtronic OBV571 VVI mode) for atrial fibrillation with slow ventricular response and pause up to 4 sec, HTN with mild orthostatic hypotension and microalbuminuria, HLD, former cigarette smoker, BPH, acquired polycythemia, thrombocytopenia, benign paroxysmal position vertigo, gallstones, glaucoma and hiatal hernia with GERD. He was initially seen 6/26/08 with stenosis of  D1 branch of LAD with moderate diffuse LV dysfunction, no PCI. On 6/1/08, he presented to ED at [facility] with chest pain and /120 mm Hg. He received IV nitroglycerin (or nitroprusside) and was discharged to home after undergoing stress test.  In June 2012, he was admitted to [facility] with left shoulder pain and  / 110 -117 mm Hg, despite all medication. He was treated  and discharged after about 12 hours, with no work up for ischemia. In August 2012, he used doxazosin only when BP was elevated. Quinapril (Accupril) was replaced with Diovan. He stopped isosorbide mononitrate (Imdur). On Thanksgiving day (11/22/12) he developed sudden warmth and dizziness while [place]. He opened the window to get more air, made it across the bridge and then pulled over to the side of the road.  He began retching and vomiting, no LOC. There was no abdominal pain or heartburn. He was admitted to [facility] for 48 hours. After discharge, by 12/24/12, there were periods of dizziness and unsteadiness, but no severe nausea. He had occasional one-two ounce whiskey or cognac. He was not smoking.By 12/13/13, he was not smoking and had no cravings. He drank one to two glasses wine or two ounces whisky / cognac, several days per week. He used quinapril, not Diovan. 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 He underwent surgical resection 7/9/14; no additional treatment required. He lost up to twenty pounds after surgery, but subsequently regained weight. There was occasional chest pain walking, but resolved with continued walking. On 2/11/15, he noted discomfort from right inguinal hernia and small midline abdominal hernia.  On 2/10/16, his chief complaint was exertional shortness of breath. He was in the ED 10 days prior with nasal bleeding; INR ~2.0. He was hospitalized for one week in October 2016 for pneumonia. By 8/11/17, self obtained SBP frequently elevated.PPM generator changed 3/11/19 for battery RYAN. He was diagnosed with acute cholecystitis with gallstones and after changing diet, there was decreased abdominal pain. He underwent laparoscopic cholecystectomy with simultaneous repair of small right periumbilical incisional hernia with Dr. Garcia Held 9/24/19. By 9/1/2020, he had recurrent bilateral inguinal herniae, L > R s/p repair 9/24/2020. He stopped rosuvastatin on his own in August, though not because of side effects. He did not take antihypertensive medication 8/31/2020. On 2/19/21, he was admitted to [facility] for 4 days, having presented with sense of fullness in abdomen after eating, pushing upwards towards the chest, resulting in inability to take deep breath. CT abd / chest reported no significant abnormalities other than small amount of fluid around lungs (poss pleural effusion). He was discharged with addition of furosemide 20 mg daily to his home regimen. He was seen in f/u by Dr. Tuyet YAN. By 3/31/21, abdominal fullness was resolved. There was still intermittent SOB with mild activity (e.g. one half block or > 1 flight stairs). He had not received COVID vaccine.

## 2021-04-01 NOTE — END OF VISIT
[Time Spent: ___ minutes] : I have spent [unfilled] minutes of time on the encounter. [>50% of the face to face encounter time was spent on counseling and/or coordination of care for ___] : Greater than 50% of the face to face encounter time was spent on counseling and/or coordination of care for [unfilled] [>50% of Time Spent on Counseling for ____] : Greater than 50% of the encounter time was spent on counseling for [unfilled]

## 2021-04-07 ENCOUNTER — APPOINTMENT (OUTPATIENT)
Dept: ELECTROPHYSIOLOGY | Facility: CLINIC | Age: 76
End: 2021-04-07
Payer: MEDICARE

## 2021-04-07 ENCOUNTER — NON-APPOINTMENT (OUTPATIENT)
Age: 76
End: 2021-04-07

## 2021-04-07 PROCEDURE — 93296 REM INTERROG EVL PM/IDS: CPT

## 2021-04-07 PROCEDURE — 93294 REM INTERROG EVL PM/LDLS PM: CPT

## 2021-05-02 ENCOUNTER — RX RENEWAL (OUTPATIENT)
Age: 76
End: 2021-05-02

## 2021-05-09 ENCOUNTER — TRANSCRIPTION ENCOUNTER (OUTPATIENT)
Age: 76
End: 2021-05-09

## 2021-05-12 ENCOUNTER — TRANSCRIPTION ENCOUNTER (OUTPATIENT)
Age: 76
End: 2021-05-12

## 2021-05-28 ENCOUNTER — APPOINTMENT (OUTPATIENT)
Dept: VASCULAR SURGERY | Facility: CLINIC | Age: 76
End: 2021-05-28
Payer: MEDICARE

## 2021-05-28 VITALS — DIASTOLIC BLOOD PRESSURE: 85 MMHG | HEART RATE: 66 BPM | SYSTOLIC BLOOD PRESSURE: 129 MMHG

## 2021-05-28 VITALS
DIASTOLIC BLOOD PRESSURE: 88 MMHG | WEIGHT: 170 LBS | HEIGHT: 67 IN | HEART RATE: 69 BPM | SYSTOLIC BLOOD PRESSURE: 136 MMHG | BODY MASS INDEX: 26.68 KG/M2

## 2021-05-28 PROCEDURE — 99203 OFFICE O/P NEW LOW 30 MIN: CPT

## 2021-05-28 PROCEDURE — 93970 EXTREMITY STUDY: CPT

## 2021-07-08 ENCOUNTER — NON-APPOINTMENT (OUTPATIENT)
Age: 76
End: 2021-07-08

## 2021-07-08 ENCOUNTER — APPOINTMENT (OUTPATIENT)
Dept: ELECTROPHYSIOLOGY | Facility: CLINIC | Age: 76
End: 2021-07-08
Payer: MEDICARE

## 2021-07-08 PROCEDURE — 93294 REM INTERROG EVL PM/LDLS PM: CPT

## 2021-07-08 PROCEDURE — 93296 REM INTERROG EVL PM/IDS: CPT

## 2021-07-14 ENCOUNTER — APPOINTMENT (OUTPATIENT)
Dept: CARDIOLOGY | Facility: CLINIC | Age: 76
End: 2021-07-14
Payer: MEDICARE

## 2021-07-14 ENCOUNTER — NON-APPOINTMENT (OUTPATIENT)
Age: 76
End: 2021-07-14

## 2021-07-14 VITALS
DIASTOLIC BLOOD PRESSURE: 79 MMHG | TEMPERATURE: 97.2 F | OXYGEN SATURATION: 100 % | RESPIRATION RATE: 16 BRPM | SYSTOLIC BLOOD PRESSURE: 123 MMHG | BODY MASS INDEX: 26.84 KG/M2 | HEIGHT: 67 IN | WEIGHT: 171 LBS | HEART RATE: 73 BPM

## 2021-07-14 VITALS — HEART RATE: 66 BPM | DIASTOLIC BLOOD PRESSURE: 82 MMHG | OXYGEN SATURATION: 99 % | SYSTOLIC BLOOD PRESSURE: 120 MMHG

## 2021-07-14 VITALS — SYSTOLIC BLOOD PRESSURE: 123 MMHG | HEART RATE: 67 BPM | OXYGEN SATURATION: 100 % | DIASTOLIC BLOOD PRESSURE: 79 MMHG

## 2021-07-14 DIAGNOSIS — Z95.0 PRESENCE OF CARDIAC PACEMAKER: ICD-10-CM

## 2021-07-14 DIAGNOSIS — I49.5 SICK SINUS SYNDROME: ICD-10-CM

## 2021-07-14 DIAGNOSIS — E66.3 OVERWEIGHT: ICD-10-CM

## 2021-07-14 DIAGNOSIS — K59.09 OTHER CONSTIPATION: ICD-10-CM

## 2021-07-14 LAB
ALBUMIN SERPL ELPH-MCNC: 4.2 G/DL
ALP BLD-CCNC: 134 U/L
ALT SERPL-CCNC: 17 U/L
ANION GAP SERPL CALC-SCNC: 14 MMOL/L
AST SERPL-CCNC: 25 U/L
BILIRUB SERPL-MCNC: 2.1 MG/DL
BUN SERPL-MCNC: 27 MG/DL
CALCIUM SERPL-MCNC: 9.7 MG/DL
CHLORIDE SERPL-SCNC: 103 MMOL/L
CO2 SERPL-SCNC: 25 MMOL/L
CREAT SERPL-MCNC: 0.91 MG/DL
GLUCOSE SERPL-MCNC: 62 MG/DL
NT-PROBNP SERPL-MCNC: 2223 PG/ML
POTASSIUM SERPL-SCNC: 4.3 MMOL/L
PROT SERPL-MCNC: 7.6 G/DL
SODIUM SERPL-SCNC: 142 MMOL/L

## 2021-07-14 PROCEDURE — 36415 COLL VENOUS BLD VENIPUNCTURE: CPT

## 2021-07-14 PROCEDURE — 93040 RHYTHM ECG WITH REPORT: CPT | Mod: 59

## 2021-07-14 PROCEDURE — 99215 OFFICE O/P EST HI 40 MIN: CPT

## 2021-07-14 PROCEDURE — 93000 ELECTROCARDIOGRAM COMPLETE: CPT

## 2021-07-14 RX ORDER — RIFAXIMIN 550 MG/1
550 TABLET ORAL
Qty: 42 | Refills: 0 | Status: ACTIVE | COMMUNITY
Start: 2021-07-14

## 2021-07-14 RX ORDER — OMEPRAZOLE 40 MG/1
40 CAPSULE, DELAYED RELEASE ORAL
Qty: 90 | Refills: 3 | Status: ACTIVE | COMMUNITY
Start: 2021-07-14

## 2021-07-14 RX ORDER — LINACLOTIDE 145 UG/1
145 CAPSULE, GELATIN COATED ORAL DAILY
Qty: 90 | Refills: 3 | Status: ACTIVE | COMMUNITY
Start: 2021-07-14

## 2021-07-14 RX ORDER — DICLOFENAC SODIUM 20 MG/G
2 SOLUTION TOPICAL DAILY
Qty: 1 | Refills: 3 | Status: ACTIVE | COMMUNITY
Start: 2021-07-14

## 2021-07-14 NOTE — REASON FOR VISIT
[Follow-Up - Clinic] : a clinic follow-up of [Atrial Fibrillation] : atrial fibrillation [Chest Pain] : chest pain [Coronary Artery Disease] : coronary artery disease [Dyspnea] : dyspnea [Hyperlipidemia] : hyperlipidemia

## 2021-07-15 LAB
BASOPHILS # BLD AUTO: 0.07 K/UL
BASOPHILS NFR BLD AUTO: 1.5 %
EOSINOPHIL # BLD AUTO: 0.25 K/UL
EOSINOPHIL NFR BLD AUTO: 5.4 %
HCT VFR BLD CALC: 54 %
HGB BLD-MCNC: 17.1 G/DL
IMM GRANULOCYTES NFR BLD AUTO: 0.4 %
LYMPHOCYTES # BLD AUTO: 1.31 K/UL
LYMPHOCYTES NFR BLD AUTO: 28.2 %
MAN DIFF?: NORMAL
MCHC RBC-ENTMCNC: 30.3 PG
MCHC RBC-ENTMCNC: 31.7 GM/DL
MCV RBC AUTO: 95.7 FL
MONOCYTES # BLD AUTO: 0.26 K/UL
MONOCYTES NFR BLD AUTO: 5.6 %
NEUTROPHILS # BLD AUTO: 2.74 K/UL
NEUTROPHILS NFR BLD AUTO: 58.9 %
PLATELET # BLD AUTO: 96 K/UL
RBC # BLD: 5.64 M/UL
RBC # FLD: 14 %
WBC # FLD AUTO: 4.65 K/UL

## 2021-07-15 NOTE — PHYSICAL EXAM
[General Appearance - Well Developed] : well developed [Normal Appearance] : normal appearance [Well Groomed] : well groomed [General Appearance - Well Nourished] : well nourished [No Deformities] : no deformities [General Appearance - In No Acute Distress] : no acute distress [Normal Conjunctiva] : the conjunctiva exhibited no abnormalities [Eyelids - No Xanthelasma] : the eyelids demonstrated no xanthelasmas [Normal Oral Mucosa] : normal oral mucosa [No Oral Pallor] : no oral pallor [No Oral Cyanosis] : no oral cyanosis [Normal Jugular Venous A Waves Present] : normal jugular venous A waves present [Normal Jugular Venous V Waves Present] : normal jugular venous V waves present [No Jugular Venous [place] A Waves] : no jugular venous burgos A waves [Respiration, Rhythm And Depth] : normal respiratory rhythm and effort [Exaggerated Use Of Accessory Muscles For Inspiration] : no accessory muscle use [Auscultation Breath Sounds / Voice Sounds] : lungs were clear to auscultation bilaterally [Scattered Wheezes] : scattered wheezing [Abdomen Soft] : soft [Abdomen Tenderness] : non-tender [Abdomen Mass (___ Cm)] : no abdominal mass palpated [Abnormal Walk] : normal gait [Gait - Sufficient For Exercise Testing] : the gait was sufficient for exercise testing [Nail Clubbing] : no clubbing of the fingernails [Cyanosis, Localized] : no localized cyanosis [Petechial Hemorrhages (___cm)] : no petechial hemorrhages [Skin Color & Pigmentation] : normal skin color and pigmentation [] : no rash [No Venous Stasis] : no venous stasis [No Skin Ulcers] : no skin ulcer [No Xanthoma] : no  xanthoma was observed [Smooth] : with smooth borders [Multiple Papules] : multiple [Black] : black [Brown] : brown [Multiple] : multiple [Oriented To Time, Place, And Person] : oriented to person, place, and time [Affect] : the affect was normal [Mood] : the mood was normal [No Anxiety] : not feeling anxious [5th Left ICS - MCL] : palpated at the 5th LICS in the midclavicular line [Diffuse] : diffuse [No Precordial Heave] : no precordial heave was noted [Normal Rate] : normal [Rhythm Regular] : regular [Normal S1] : normal S1 [No Murmur] : no murmurs heard [2+] : left 2+ [No Abnormalities] : the abdominal aorta was not enlarged and no bruit was heard [No Pitting Edema] : no pitting edema present [Rt] : varicose veins of the right leg noted [Lt] : varicose veins of the left leg noted [Prolonged Exp Time] : with normal expiratory time [Increased E/I Ratio] : with normal expiratory/inspiratory ratio  [FreeTextEntry1] : left ankle circumf 9.0"; right ankle 9.5"  (previously 9.5 and 10.0 in respectively).  [Apical Thrill] : no thrill palpable at the apex [S4] : no S4 [Click] : no click [Pericardial Rub] : no pericardial rub [Right Carotid Bruit] : no bruit heard over the right carotid [Left Carotid Bruit] : no bruit heard over the left carotid [Right Femoral Bruit] : no bruit heard over the right femoral artery [Left Femoral Bruit] : no bruit heard over the left femoral artery

## 2021-07-15 NOTE — DISCUSSION/SUMMARY
[Patient] : the patient [Minutes Spent: ___] : for [unfilled] ~Uminutes [With Me] : with me [___ Month(s)] : in [unfilled] month(s) [FreeTextEntry1] : \par WEIGHT GOALS:\par \par Category				BMI range - kg/m2	BMI Prime\par Very severely underweight		less than 15		less than 0.60	\par Severely underweight			from 15.0 to 16.0		from 0.60 to 0.64	\par Underweight				from 16.0 to 18.5		from 0.64 to 0.74	\par Normal (healthy weight)			from 18.5 to 25		from 0.74 to 1.0	\par Overweight				from 25 to 30		from 1.0 to 1.2	\par Obese Class I (Moderately obese)		from 30 to 35		from 1.2 to 1.4	\par Obese Class II (Severely obese)		from 35 to 40		from 1.4 to 1.6	\par Obese Class III (Very severely obese)	over 40			over 1.6	\par \par Your current weight is 171 lbs (181 lbs. 02/28/2020) Given current weight and height 5'7", your calculated body mass index (BMI) is 26.8 kg/sqm. Normal BMI is 18.5-25 kg/sqm. Thus current weight is in the overweight category. Abdominal waist circumference is measured at the level of the umbilicus and is thus not a pants waist measurement. Your current abdominal waist circumference is 40 inches. Normal abdominal waist circumference is < 32 inches for women and < 37 inches for men\par \par MEDITERRANEAN DIET:\par The Mediterranean diet does not limit calories. Instead, it provides guidelines for what foods you should eat more of and what foods you should eat less of. \par Every day: \par • Eat several servings of plant foods. These include fruits and vegetables, potatoes, breads and whole grains, beans, nuts, and seeds. As much as you can, eat fresh rather than canned or frozen foods. \par • Use olive oil for cooking and salad dressings. You can have up to 35% of your calories from fats, including olive and other vegetable oils. However, limit the amount you eat of saturated fats like butter, margarine, lard, and animal fat. They should add up to no more than 8 percent of your total calories. Include "partially hydrogenated" oils in your saturated fat limit. \par • Every day, have a small amount of cheese and yogurt. Look for low-fat and non-fat types. Think of cheese as a condiment like salt or pepper, not a main dish. For example, you can sprinkle a little Parmesan cheese on pasta or put a little cheese in a salad. Cheese contains saturated fat. Mozzarella cheese is much lower in fat than most types of cheese. \par • If alcohol is not a problem for you, have one (for women) or two (for men) glasses of red wine with dinner. If alcohol is not a good idea for you, try adding grape juice to your diet instead. It gives you the same heart-protecting benefits of wine without alcohol. It is very sweet, so you might use it for your "dessert." \par Every week: \par • Eat some fish and poultry. Do not eat chicken or turkey skin. \par • Eat no more than 4 eggs a week, including eggs used in cooking. Egg yolks contain saturated fat. \par • Eat no more than 2 or 3 sweets or desserts that contain lots of sugar or honey. Try fresh fruit or sweets made with fruit concentrate instead. \par Every month: \par • Eat no more than 12 to 16 ounces of red meat. Lean meat is preferable

## 2021-07-15 NOTE — CARDIOLOGY SUMMARY
[de-identified] : 7/14/21, electronic ventricular pacemaker with complete ventricular pacing at 60 bpm. QRSd 142 ms Prior ECG 3/31/21 showed electronic ventricular pacemaker with complete ventricular pacing at 62 bpm; QRSd 191 ms.  Rhythm / RR - interval analysis 7/14/21 observed 90 beats over 90 sec with mean HR 62 bpm; mean  ms (928-1004); Max/Min 108%; RR SD 11 and RR CV 1%. There were no PVCs or PACs [de-identified] : 10/5/2020, the left ventricle was normal in size. There was no evidence of infarction or inducible ischemia. There was a small mild defect in the septum on both stress and rest images. The observed defect was most likely due to either 100% paced beats with LBBB morphology or due to RV pressure / volume overload. Post-stress gated wall motion analysis was performed (LVEF= 67 %;LVEDV = 78 ml.), revealing normal LV function.There was paradoxical motion of the septum which is likely due to ventricular pacing, but may in part be due to pressure / volume overload from the RV.  The RV appeared enlarged and mildly diffusely hypocontractile [de-identified] : 3/17/21, mitral annular calcification, otherwise normal mitral valve with mild regurgitation. The aortic root appeared normal. The left atrium was severely dilated with volume index 68 cc/sqm. The LV appeared normal in size and wall thickness, with grossly normal systolic function, LVEF 66%. There was paradoxical motion of the septum, consistent with ventricular pacing. The RA was severely enlarged. The RV was enlarged, with normal systolic function. The tricuspid valve appeared normal with moderate regurgitation. The pulmonic valve appeared normal, with minimal regurgitation. The pericardium appeared normal, with no effusion. There were no significant changes compared to prior study 10/7/2020 [de-identified] : CT chest: 2/17/21, 4 mm noncalcified pulmonary nodules are stable since 2/2020. There is mild centrilobular emphysema, chronic bronchitis and mild cylindrical bronchiectasis with new small layering right pleural effusion. There was cardiomegaly with biatrial dilatation, calcific atherosclerotic CAD and 4.4 cm ascending aortic aneurysm, which may represent poststenotic dilatation given calcified aortic valve leaflets (note normal aortic root on above TTE). There was new ascites and 3 mm nonobstructing left renal calculus [de-identified] : PET, 6/14/21, there is no evidence of of myocardial ischemia or scarring. Values for regional myocardial flow reserve were normal in all coronary territories. Myocardial flow reserve (Normal > 2.0) LAD 2.85; LCX 2.55; RCA 2.95; LV global 2.76. There was normal LV cavity size and normal wall motion with LVEF 75%. [de-identified] : 10/2/2020, Medtronic [id number] [place] XT SR MRI implanted 3/11/19, as prior generator battery was RYAN. VVIR mode with lower rate 60 ppm and upper sensor rate 120 ppm. ADL (Activities of Daily Living) Rate, the average target rate that the patient achieves for moderate activities, is 95 bpm. PPM battery with remaining longevity 11.6 years. Routine remote monitoring transmission noted all measured data WNL. Stored data revealed no new events [de-identified] : 10/1/04, mild diffuse LAD disease; 100% stenosis of D1 branch of LAD; mild diffuse disease of OM1 branch of LCX; mild diffuse RCA disease; mild to moderate global LV dysfunction with LVEF 45%

## 2021-07-15 NOTE — ADDENDUM
[FreeTextEntry1] : I spent 60 minutes face to face time with the patient, from 8:00 to 9:00 on 3/317/14/21. Thirty minutes were devoted to patient counseling as outlined above in the End of Visit section.  Prior to this visit, I reviewed records of Dr. Jagjit Walsh 1st Trimester Sonogram/20 Week Level II Sonogram/Follow up Sonogram for Growth

## 2021-07-15 NOTE — HISTORY OF PRESENT ILLNESS
[FreeTextEntry1] : Mr. Mccoy has h/o single vessel CAD with moderate diffuse LV dysfunction, PPM 2001 (Medtronic AZQ157 VVI mode) for atrial fibrillation with slow ventricular response and pause up to 4 sec, HTN with mild orthostatic hypotension and microalbuminuria, HLD, former cigarette smoker, BPH, acquired polycythemia, thrombocytopenia, benign paroxysmal position vertigo, gallstones, glaucoma and hiatal hernia with GERD. He was initially seen 6/26/08 with stenosis of  D1 branch of LAD with moderate diffuse LV dysfunction, no PCI. On 6/1/08, he presented to ED at [facility] with chest pain and /120 mm Hg. He received IV nitroglycerin (or nitroprusside) and was discharged to home after undergoing stress test.  In June 2012, he was admitted to [facility] with left shoulder pain and  / 110 -117 mm Hg, despite all medication. He was treated  and discharged after about 12 hours, with no work up for ischemia. In August 2012, he used doxazosin only when BP was elevated. Quinapril (Accupril) was replaced with Diovan. He stopped isosorbide mononitrate (Imdur). On Thanksgiving day (11/22/12) he developed sudden warmth and dizziness while [place]. He opened the window to get more air, made it across the bridge and then pulled over to the side of the road.  He began retching and vomiting, no LOC. There was no abdominal pain or heartburn. He was admitted to [facility] for 48 hours. After discharge, by 12/24/12, there were periods of dizziness and unsteadiness, but no severe nausea. He had occasional one-two ounce whiskey or cognac. He was not smoking.By 12/13/13, he was not smoking and had no cravings. He drank one to two glasses wine or two ounces whisky / cognac, several days per week. He used quinapril, not Diovan. 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 He underwent surgical resection 7/9/14; no additional treatment required. He lost up to twenty pounds after surgery, but subsequently regained weight. There was occasional chest pain walking, but resolved with continued walking. On 2/11/15, he noted discomfort from right inguinal hernia and small midline abdominal hernia.  On 2/10/16, his chief complaint was exertional shortness of breath. He was in the ED 10 days prior with nasal bleeding; INR ~2.0. He was hospitalized for one week in October 2016 for pneumonia. By 8/11/17, self obtained SBP frequently elevated.PPM generator changed 3/11/19 for battery RYAN. He was diagnosed with acute cholecystitis with gallstones and after changing diet, there was decreased abdominal pain. He underwent laparoscopic cholecystectomy with simultaneous repair of small right periumbilical incisional hernia with Dr. Garcia Held 9/24/19. By 9/1/2020, he had recurrent bilateral inguinal herniae, L > R s/p repair 9/24/2020. He stopped rosuvastatin on his own in August, though not because of side effects. He did not take antihypertensive medication 8/31/2020. On 2/19/21, he was admitted to [facility] for 4 days, having presented with sense of fullness in abdomen after eating, pushing upwards towards the chest, resulting in inability to take deep breath. CT abd / chest reported no significant abnormalities other than small amount of fluid around lungs (poss pleural effusion). He was discharged with addition of furosemide 20 mg daily to his home regimen. He was seen in f/u by Dr. Tuyet YAN. By 3/31/21, abdominal fullness was resolved. There was still intermittent SOB with mild activity (e.g. one half block or > 1 flight stairs). He had not received COVID vaccine. He went to [facility] 6/14/21 with chest pain and elevated self /115 mmHg . Cardiac PET normal perfusion and MFR. On 7/14/21, chief complaint abdominal bloating, gas and constipation.

## 2021-10-05 ENCOUNTER — APPOINTMENT (OUTPATIENT)
Dept: ELECTROPHYSIOLOGY | Facility: CLINIC | Age: 76
End: 2021-10-05

## 2021-10-07 ENCOUNTER — APPOINTMENT (OUTPATIENT)
Dept: ELECTROPHYSIOLOGY | Facility: CLINIC | Age: 76
End: 2021-10-07
Payer: MEDICARE

## 2021-10-07 ENCOUNTER — NON-APPOINTMENT (OUTPATIENT)
Age: 76
End: 2021-10-07

## 2021-10-07 PROCEDURE — 93296 REM INTERROG EVL PM/IDS: CPT

## 2021-10-07 PROCEDURE — 93294 REM INTERROG EVL PM/LDLS PM: CPT

## 2021-10-29 ENCOUNTER — TRANSCRIPTION ENCOUNTER (OUTPATIENT)
Age: 76
End: 2021-10-29

## 2021-11-09 ENCOUNTER — TRANSCRIPTION ENCOUNTER (OUTPATIENT)
Age: 76
End: 2021-11-09

## 2021-12-03 ENCOUNTER — APPOINTMENT (OUTPATIENT)
Dept: VASCULAR SURGERY | Facility: CLINIC | Age: 76
End: 2021-12-03

## 2022-01-06 ENCOUNTER — APPOINTMENT (OUTPATIENT)
Dept: ELECTROPHYSIOLOGY | Facility: CLINIC | Age: 77
End: 2022-01-06
Payer: MEDICARE

## 2022-01-06 ENCOUNTER — NON-APPOINTMENT (OUTPATIENT)
Age: 77
End: 2022-01-06

## 2022-01-06 PROCEDURE — 93296 REM INTERROG EVL PM/IDS: CPT

## 2022-01-06 PROCEDURE — 93294 REM INTERROG EVL PM/LDLS PM: CPT

## 2022-01-26 ENCOUNTER — APPOINTMENT (OUTPATIENT)
Dept: CARDIOLOGY | Facility: CLINIC | Age: 77
End: 2022-01-26

## 2022-03-15 NOTE — H&P PST ADULT - BREASTS DETAILS
Happy to see Rozina virtually at 4:45 or later for a med check - ideally in early April. You can put her in my 4:30 slot and I'll just call her later, we've done that before and it's worked out just fine :)     Thanks so much!   Claudia      normal shape

## 2022-04-06 ENCOUNTER — APPOINTMENT (OUTPATIENT)
Dept: CARDIOLOGY | Facility: CLINIC | Age: 77
End: 2022-04-06

## 2022-04-08 ENCOUNTER — APPOINTMENT (OUTPATIENT)
Dept: ELECTROPHYSIOLOGY | Facility: CLINIC | Age: 77
End: 2022-04-08

## 2022-04-11 ENCOUNTER — FORM ENCOUNTER (OUTPATIENT)
Age: 77
End: 2022-04-11

## 2022-05-05 ENCOUNTER — APPOINTMENT (OUTPATIENT)
Dept: ELECTROPHYSIOLOGY | Facility: CLINIC | Age: 77
End: 2022-05-05
Payer: MEDICARE

## 2022-05-05 ENCOUNTER — NON-APPOINTMENT (OUTPATIENT)
Age: 77
End: 2022-05-05

## 2022-05-05 PROCEDURE — 93294 REM INTERROG EVL PM/LDLS PM: CPT

## 2022-05-05 PROCEDURE — 93296 REM INTERROG EVL PM/IDS: CPT

## 2022-08-04 ENCOUNTER — APPOINTMENT (OUTPATIENT)
Dept: ELECTROPHYSIOLOGY | Facility: CLINIC | Age: 77
End: 2022-08-04

## 2022-08-04 ENCOUNTER — NON-APPOINTMENT (OUTPATIENT)
Age: 77
End: 2022-08-04

## 2022-08-04 PROCEDURE — 93296 REM INTERROG EVL PM/IDS: CPT

## 2022-08-04 PROCEDURE — 93294 REM INTERROG EVL PM/LDLS PM: CPT

## 2022-11-04 ENCOUNTER — APPOINTMENT (OUTPATIENT)
Dept: ELECTROPHYSIOLOGY | Facility: CLINIC | Age: 77
End: 2022-11-04

## 2022-11-04 ENCOUNTER — NON-APPOINTMENT (OUTPATIENT)
Age: 77
End: 2022-11-04

## 2022-11-04 PROCEDURE — 93296 REM INTERROG EVL PM/IDS: CPT

## 2022-11-04 PROCEDURE — 93294 REM INTERROG EVL PM/LDLS PM: CPT

## 2022-11-28 NOTE — ASU PATIENT PROFILE, ADULT - PSH
History of colon cancer  History of Colon surgery-right hemicolectomy for cancer with a midline abdominal incision  Pacemaker  [employer]     implant date: March 11, 2019  Serial # [id number], Model# [id number]   Low Dose Naltrexone Counseling- I discussed with the patient the potential risks and side effects of low dose naltrexone including but not limited to: more vivid dreams, headaches, nausea, vomiting, abdominal pain, fatigue, dizziness, and anxiety.

## 2023-02-07 ENCOUNTER — NON-APPOINTMENT (OUTPATIENT)
Age: 78
End: 2023-02-07

## 2023-02-07 ENCOUNTER — APPOINTMENT (OUTPATIENT)
Dept: ELECTROPHYSIOLOGY | Facility: CLINIC | Age: 78
End: 2023-02-07
Payer: MEDICARE

## 2023-02-07 PROCEDURE — 93296 REM INTERROG EVL PM/IDS: CPT

## 2023-02-07 PROCEDURE — 93294 REM INTERROG EVL PM/LDLS PM: CPT

## 2023-02-08 ENCOUNTER — NON-APPOINTMENT (OUTPATIENT)
Age: 78
End: 2023-02-08

## 2023-02-08 ENCOUNTER — APPOINTMENT (OUTPATIENT)
Dept: CARDIOLOGY | Facility: CLINIC | Age: 78
End: 2023-02-08
Payer: MEDICARE

## 2023-02-08 VITALS — HEART RATE: 71 BPM | DIASTOLIC BLOOD PRESSURE: 65 MMHG | SYSTOLIC BLOOD PRESSURE: 114 MMHG | OXYGEN SATURATION: 96 %

## 2023-02-08 VITALS
BODY MASS INDEX: 25.74 KG/M2 | HEIGHT: 67 IN | OXYGEN SATURATION: 98 % | HEART RATE: 79 BPM | WEIGHT: 164 LBS | DIASTOLIC BLOOD PRESSURE: 67 MMHG | SYSTOLIC BLOOD PRESSURE: 117 MMHG | RESPIRATION RATE: 16 BRPM

## 2023-02-08 VITALS — SYSTOLIC BLOOD PRESSURE: 114 MMHG | HEART RATE: 72 BPM | DIASTOLIC BLOOD PRESSURE: 69 MMHG | OXYGEN SATURATION: 98 %

## 2023-02-08 VITALS — SYSTOLIC BLOOD PRESSURE: 112 MMHG | DIASTOLIC BLOOD PRESSURE: 67 MMHG | HEART RATE: 72 BPM | OXYGEN SATURATION: 96 %

## 2023-02-08 VITALS — OXYGEN SATURATION: 97 % | HEART RATE: 73 BPM | SYSTOLIC BLOOD PRESSURE: 111 MMHG | DIASTOLIC BLOOD PRESSURE: 66 MMHG

## 2023-02-08 DIAGNOSIS — Z01.818 ENCOUNTER FOR OTHER PREPROCEDURAL EXAMINATION: ICD-10-CM

## 2023-02-08 DIAGNOSIS — K40.90 UNILATERAL INGUINAL HERNIA, W/OUT OBSTRUCTION OR GANGRENE, NOT SPECIFIED AS RECURRENT: ICD-10-CM

## 2023-02-08 DIAGNOSIS — R07.89 OTHER CHEST PAIN: ICD-10-CM

## 2023-02-08 DIAGNOSIS — F51.01 PRIMARY INSOMNIA: ICD-10-CM

## 2023-02-08 DIAGNOSIS — R06.09 OTHER FORMS OF DYSPNEA: ICD-10-CM

## 2023-02-08 DIAGNOSIS — K43.2 INCISIONAL HERNIA W/OUT OBSTRUCTION OR GANGRENE: ICD-10-CM

## 2023-02-08 DIAGNOSIS — I83.893 VARICOSE VEINS OF BILATERAL LOWER EXTREMITIES WITH OTHER COMPLICATIONS: ICD-10-CM

## 2023-02-08 DIAGNOSIS — Z45.010 ENCOUNTER FOR CHECKING AND TESTING OF CARDIAC PACEMAKER PULSE GENERATOR [BATTERY]: ICD-10-CM

## 2023-02-08 DIAGNOSIS — R07.9 CHEST PAIN, UNSPECIFIED: ICD-10-CM

## 2023-02-08 DIAGNOSIS — R06.02 SHORTNESS OF BREATH: ICD-10-CM

## 2023-02-08 DIAGNOSIS — I83.90 ASYMPTOMATIC VARICOSE VEINS OF UNSPECIFIED LOWER EXTREMITY: ICD-10-CM

## 2023-02-08 DIAGNOSIS — Z71.9 COUNSELING, UNSPECIFIED: ICD-10-CM

## 2023-02-08 DIAGNOSIS — Z01.810 ENCOUNTER FOR PREPROCEDURAL CARDIOVASCULAR EXAMINATION: ICD-10-CM

## 2023-02-08 PROCEDURE — 93040 RHYTHM ECG WITH REPORT: CPT | Mod: 59

## 2023-02-08 PROCEDURE — 99215 OFFICE O/P EST HI 40 MIN: CPT

## 2023-02-08 PROCEDURE — 36415 COLL VENOUS BLD VENIPUNCTURE: CPT

## 2023-02-08 PROCEDURE — 93000 ELECTROCARDIOGRAM COMPLETE: CPT

## 2023-02-08 RX ORDER — POTASSIUM CHLORIDE 750 MG/1
10 TABLET, FILM COATED, EXTENDED RELEASE ORAL
Refills: 0 | Status: ACTIVE | COMMUNITY
Start: 2023-02-08

## 2023-02-08 RX ORDER — SILDENAFIL 50 MG/1
50 TABLET ORAL
Qty: 10 | Refills: 3 | Status: ACTIVE | COMMUNITY
Start: 2023-02-08

## 2023-02-08 RX ORDER — FUROSEMIDE 20 MG/1
20 TABLET ORAL DAILY
Qty: 90 | Refills: 3 | Status: DISCONTINUED | COMMUNITY
Start: 2021-03-31 | End: 2023-02-08

## 2023-02-08 RX ORDER — METOPROLOL SUCCINATE 25 MG/1
25 TABLET, EXTENDED RELEASE ORAL DAILY
Qty: 90 | Refills: 3 | Status: ACTIVE | COMMUNITY
Start: 2023-02-08

## 2023-02-08 RX ORDER — APIXABAN 2.5 MG/1
2.5 TABLET, FILM COATED ORAL
Qty: 180 | Refills: 3 | Status: ACTIVE | COMMUNITY
Start: 2018-09-05

## 2023-02-08 RX ORDER — BUMETANIDE 1 MG/1
1 TABLET ORAL
Qty: 90 | Refills: 3 | Status: ACTIVE | COMMUNITY
Start: 2023-02-08

## 2023-02-09 PROBLEM — R06.02 SOB (SHORTNESS OF BREATH): Noted: 2021-03-08

## 2023-02-09 PROBLEM — I83.893 VARICOSE VEINS OF BOTH LEGS WITH EDEMA: Status: ACTIVE | Noted: 2021-03-31

## 2023-02-09 PROBLEM — R07.89 CHEST PAIN, ATYPICAL: Status: ACTIVE | Noted: 2020-10-02

## 2023-02-09 PROBLEM — I83.90 VARICOSE VEINS: Noted: 2019-09-13

## 2023-02-09 PROBLEM — Z45.010 ELECTIVE REPLACEMENT INDICATED FOR CARDIAC PACEMAKER BATTERY AT END OF LIFESPAN: Status: RESOLVED | Noted: 2019-02-05 | Resolved: 2023-02-09

## 2023-02-09 PROBLEM — Z71.9 ENCOUNTER FOR CONSULTATION: Status: RESOLVED | Noted: 2019-05-06 | Resolved: 2023-02-09

## 2023-02-09 PROBLEM — Z01.818 PREOP TESTING: Noted: 2020-10-09

## 2023-02-09 PROBLEM — Z01.810 PREOPERATIVE CARDIOVASCULAR EXAMINATION: Status: RESOLVED | Noted: 2019-09-13 | Resolved: 2023-02-09

## 2023-02-09 PROBLEM — R06.09 DYSPNEA ON EXERTION: Status: ACTIVE | Noted: 2020-10-02

## 2023-02-09 LAB
ALBUMIN SERPL ELPH-MCNC: 4.3 G/DL
ALP BLD-CCNC: 122 U/L
ALT SERPL-CCNC: 20 U/L
ANION GAP SERPL CALC-SCNC: 15 MMOL/L
APPEARANCE: CLEAR
AST SERPL-CCNC: 25 U/L
BACTERIA: NEGATIVE
BASOPHILS # BLD AUTO: 0.05 K/UL
BASOPHILS NFR BLD AUTO: 1 %
BILIRUB SERPL-MCNC: 0.9 MG/DL
BILIRUBIN URINE: NEGATIVE
BLOOD URINE: NEGATIVE
BUN SERPL-MCNC: 30 MG/DL
CALCIUM SERPL-MCNC: 9.7 MG/DL
CHLORIDE SERPL-SCNC: 100 MMOL/L
CHOLEST SERPL-MCNC: 172 MG/DL
CK SERPL-CCNC: 113 U/L
CO2 SERPL-SCNC: 24 MMOL/L
COLOR: YELLOW
CREAT SERPL-MCNC: 0.88 MG/DL
CREAT SPEC-SCNC: 36 MG/DL
EGFR: 87 ML/MIN/1.73M2
EOSINOPHIL # BLD AUTO: 0.14 K/UL
EOSINOPHIL NFR BLD AUTO: 2.9 %
ESTIMATED AVERAGE GLUCOSE: 105 MG/DL
GLUCOSE QUALITATIVE U: NEGATIVE
GLUCOSE SERPL-MCNC: 78 MG/DL
HBA1C MFR BLD HPLC: 5.3 %
HCT VFR BLD CALC: 47.2 %
HDLC SERPL-MCNC: 49 MG/DL
HGB BLD-MCNC: 16.1 G/DL
HYALINE CASTS: 0 /LPF
IMM GRANULOCYTES NFR BLD AUTO: 0.2 %
KETONES URINE: NEGATIVE
LDLC SERPL CALC-MCNC: 98 MG/DL
LDLC SERPL DIRECT ASSAY-MCNC: 111 MG/DL
LEUKOCYTE ESTERASE URINE: NEGATIVE
LYMPHOCYTES # BLD AUTO: 1.76 K/UL
LYMPHOCYTES NFR BLD AUTO: 36.5 %
MAGNESIUM SERPL-MCNC: 2.1 MG/DL
MAN DIFF?: NORMAL
MCHC RBC-ENTMCNC: 30.6 PG
MCHC RBC-ENTMCNC: 34.1 GM/DL
MCV RBC AUTO: 89.6 FL
MICROALBUMIN 24H UR DL<=1MG/L-MCNC: <1.2 MG/DL
MICROALBUMIN/CREAT 24H UR-RTO: NORMAL MG/G
MICROSCOPIC-UA: NORMAL
MONOCYTES # BLD AUTO: 0.33 K/UL
MONOCYTES NFR BLD AUTO: 6.8 %
NEUTROPHILS # BLD AUTO: 2.53 K/UL
NEUTROPHILS NFR BLD AUTO: 52.6 %
NITRITE URINE: NEGATIVE
NONHDLC SERPL-MCNC: 123 MG/DL
NT-PROBNP SERPL-MCNC: 1322 PG/ML
PH URINE: 5
PLATELET # BLD AUTO: 144 K/UL
POTASSIUM SERPL-SCNC: 4 MMOL/L
PROT SERPL-MCNC: 7.5 G/DL
PROTEIN URINE: NEGATIVE
RBC # BLD: 5.27 M/UL
RBC # FLD: 13.2 %
RED BLOOD CELLS URINE: 0 /HPF
SODIUM SERPL-SCNC: 139 MMOL/L
SPECIFIC GRAVITY URINE: 1.01
SQUAMOUS EPITHELIAL CELLS: 0 /HPF
T4 FREE SERPL-MCNC: 1.3 NG/DL
TRIGL SERPL-MCNC: 123 MG/DL
TSH SERPL-ACNC: 2.18 UIU/ML
URATE SERPL-MCNC: 7.8 MG/DL
UROBILINOGEN URINE: NORMAL
WBC # FLD AUTO: 4.82 K/UL
WHITE BLOOD CELLS URINE: 0 /HPF

## 2023-02-09 RX ORDER — TAMSULOSIN HYDROCHLORIDE 0.4 MG/1
0.4 CAPSULE ORAL DAILY
Refills: 0 | Status: ACTIVE | COMMUNITY
Start: 2016-09-15

## 2023-02-09 RX ORDER — LATANOPROST/PF 0.005 %
0.01 DROPS OPHTHALMIC (EYE)
Refills: 0 | Status: ACTIVE | COMMUNITY
Start: 2016-05-12

## 2023-02-09 NOTE — PHYSICAL EXAM
[General Appearance - Well Developed] : well developed [Normal Appearance] : normal appearance [Well Groomed] : well groomed [General Appearance - Well Nourished] : well nourished [No Deformities] : no deformities [General Appearance - In No Acute Distress] : no acute distress [Normal Conjunctiva] : the conjunctiva exhibited no abnormalities [Eyelids - No Xanthelasma] : the eyelids demonstrated no xanthelasmas [Normal Oral Mucosa] : normal oral mucosa [No Oral Pallor] : no oral pallor [No Oral Cyanosis] : no oral cyanosis [Normal Jugular Venous A Waves Present] : normal jugular venous A waves present [No Jugular Venous Burgos A Waves] : no jugular venous burgos A waves [Normal Jugular Venous V Waves Present] : normal jugular venous V waves present [Respiration, Rhythm And Depth] : normal respiratory rhythm and effort [Exaggerated Use Of Accessory Muscles For Inspiration] : no accessory muscle use [Auscultation Breath Sounds / Voice Sounds] : lungs were clear to auscultation bilaterally [Scattered Wheezes] : scattered wheezing [Abdomen Soft] : soft [Abdomen Tenderness] : non-tender [Abdomen Mass (___ Cm)] : no abdominal mass palpated [Abnormal Walk] : normal gait [Gait - Sufficient For Exercise Testing] : the gait was sufficient for exercise testing [Nail Clubbing] : no clubbing of the fingernails [Cyanosis, Localized] : no localized cyanosis [Petechial Hemorrhages (___cm)] : no petechial hemorrhages [Skin Color & Pigmentation] : normal skin color and pigmentation [No Venous Stasis] : no venous stasis [No Skin Ulcers] : no skin ulcer [No Xanthoma] : no  xanthoma was observed [Smooth] : with smooth borders [Multiple Papules] : multiple [Black] : black [Brown] : brown [Multiple] : multiple [Oriented To Time, Place, And Person] : oriented to person, place, and time [Affect] : the affect was normal [Mood] : the mood was normal [No Anxiety] : not feeling anxious [5th Left ICS - MCL] : palpated at the 5th LICS in the midclavicular line [Diffuse] : diffuse [No Precordial Heave] : no precordial heave was noted [Normal Rate] : normal [Rhythm Regular] : regular [Normal S1] : normal S1 [2+] : left 2+ [No Abnormalities] : the abdominal aorta was not enlarged and no bruit was heard [No Pitting Edema] : no pitting edema present [Rt] : varicose veins of the right leg noted [Lt] : varicose veins of the left leg noted [S4] : an S4 was heard [II] : a grade 2 [Constant] : constant [Medium] : medium pitched [Blowing] : blowing [Early] : early [Axilla] : the murmur was transmitted to the axilla [] : decreases with inspiration [Prolonged Exp Time] : with normal expiratory time [Increased E/I Ratio] : with normal expiratory/inspiratory ratio  [FreeTextEntry1] : L ankle circumf 8.5"; right ankle 8.5"  (previously 9.0" and 10.0" respectively).  [Apical Thrill] : no thrill palpable at the apex [Click] : no click [Pericardial Rub] : no pericardial rub [Right Carotid Bruit] : no bruit heard over the right carotid [Left Carotid Bruit] : no bruit heard over the left carotid [Right Femoral Bruit] : no bruit heard over the right femoral artery [Left Femoral Bruit] : no bruit heard over the left femoral artery

## 2023-02-09 NOTE — DISCUSSION/SUMMARY
[Patient] : the patient [Minutes Spent: ___] : for [unfilled] ~Uminutes [With Me] : with me [___ Month(s)] : in [unfilled] month(s) [FreeTextEntry1] : \par WEIGHT GOALS:\par \par Category				BMI range - kg/m2	BMI Prime\par Very severely underweight		less than 15		less than 0.60	\par Severely underweight			from 15.0 to 16.0		from 0.60 to 0.64	\par Underweight				from 16.0 to 18.5		from 0.64 to 0.74	\par Normal (healthy weight)			from 18.5 to 25		from 0.74 to 1.0	\par Overweight				from 25 to 30		from 1.0 to 1.2	\par Obese Class I (Moderately obese)		from 30 to 35		from 1.2 to 1.4	\par Obese Class II (Severely obese)		from 35 to 40		from 1.4 to 1.6	\par Obese Class III (Very severely obese)	over 40			over 1.6	\par \par Your current weight is 164 lbs (171 lbs on 7/14/21, 181 lbs. 02/28/20) Given current weight and height 5'7", your calculated body mass index (BMI) is 25.7 kg/sqm. Normal BMI is 18.5-25 kg/sqm. Thus current weight is in the borderline overweight category. Abdominal waist circumference is measured at the level of the umbilicus and is thus not a pants waist measurement. Your current abdominal waist circumference is 38" (40" on 7/14/21). Normal abdominal waist circumference is < 32 inches for women and < 37 inches for men\par \par MEDITERRANEAN DIET:\par The Mediterranean diet does not limit calories. Instead, it provides guidelines for what foods you should eat more of and what foods you should eat less of. \par Every day: \par • Eat several servings of plant foods. These include fruits and vegetables, potatoes, breads and whole grains, beans, nuts, and seeds. As much as you can, eat fresh rather than canned or frozen foods. \par • Use olive oil for cooking and salad dressings. You can have up to 35% of your calories from fats, including olive and other vegetable oils. However, limit the amount you eat of saturated fats like butter, margarine, lard, and animal fat. They should add up to no more than 8 percent of your total calories. Include "partially hydrogenated" oils in your saturated fat limit. \par • Every day, have a small amount of cheese and yogurt. Look for low-fat and non-fat types. Think of cheese as a condiment like salt or pepper, not a main dish. For example, you can sprinkle a little Parmesan cheese on pasta or put a little cheese in a salad. Cheese contains saturated fat. Mozzarella cheese is much lower in fat than most types of cheese. \par • If alcohol is not a problem for you, have one (for women) or two (for men) glasses of red wine with dinner. If alcohol is not a good idea for you, try adding grape juice to your diet instead. It gives you the same heart-protecting benefits of wine without alcohol. It is very sweet, so you might use it for your "dessert." \par Every week: \par • Eat some fish and poultry. Do not eat chicken or turkey skin. \par • Eat no more than 4 eggs a week, including eggs used in cooking. Egg yolks contain saturated fat. \par • Eat no more than 2 or 3 sweets or desserts that contain lots of sugar or honey. Try fresh fruit or sweets made with fruit concentrate instead. \par Every month: \par • Eat no more than 12 to 16 ounces of red meat. Lean meat is preferable

## 2023-02-09 NOTE — HISTORY OF PRESENT ILLNESS
[FreeTextEntry1] : Mr. Mccoy has h/o 1V CAD, PPM  (Medtronic GIS902 VVI mode) for afib with slow ventricular response -  4 sec pause, HTN with mild orthostatic hypotension with albuminuria, HFpEF,  s/p  tricuspid repair  3/16/22, MitralClip 22, HLD, former cigarette smoker, BPH, acquired polycythemia, thrombocytopenia, benign paroxysmal position vertigo, gallstones, glaucoma and hiatal hernia with GERD. He was initially seen 08 with stenosis of  D1 branch of LAD with moderate diffuse LV dysfunction, no PCI. On 08, he presented to ED at [facility] with chest pain and /120 mm Hg, treated with IV NTG and sent home after undergoing stress test. In 2012, he used doxazosin only when BP was elevated. He stopped isosorbide mononitrate (Imdur). On 12 he developed sudden warmth, dizziness and N/V while driving, no LOC. He had occasional one-two ounce whiskey or cognac. He was not smoking. 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 He underwent surgical resection 14; no additional treatment required. By 2/11/15, he noted discomfort from right inguinal hernia and small midline abdominal hernia.  He was in the ED 16 with nasal bleeding; INR ~2.0. He was hospitalized for one week in 2016 for pneumonia. By 17, self obtained SBP frequently elevated.PPM generator RYAN changed 3/11/19. He was diagnosed with acute cholecystitis with gallstones and underwent laparoscopic cholecystectomy with simultaneous repair of small right periumbilical incisional hernia 19. By 2020, he had recurrent bilateral inguinal herniae, L > R s/p repair 2020. He stopped rosuvastatin on his own in Aug 2020, though no side effects. On 21, he was admitted to [facility] with sense of fullness in abdomen after eating, pushing upwards towards the chest, resulting in inability to take deep breath. CT abd / chest reported no significant abnormalities other than poss small pleural effusion. Furosemide 20 mg daily was started.  He had not received COVID vaccine. He went to [facility] 21 with chest pain and self /115 mmHg . Cardiac PET normal perfusion and MFR. He presented to [facility] 2022 with acute SOB and was transferred to [facility] for assessment of valvular disease. He underwent robotic repair of tricuspid valve (Junior Diego MD) at [facility] 3/16/22 and then MitralClip (Patrick James MD) 22. He was seen [facility] ED 2023 with recurrent SOB. By 23, he lost 31 lbs, noting improved endurance and no recent . amlodipine and quinapril were discontinued as BP decreased with weight loss. Apixaban was decreased from 5 to 2.5 mg twice daily. He consumed one drink infrequently, last EtoH > 1 week ago.

## 2023-02-09 NOTE — ADDENDUM
[FreeTextEntry1] : I spent 60 minutes face to face time with the patient, from 12:00 to 13:00 on 2/8/23. Thirty minutes were devoted to patient counseling as outlined above in the End of Visit section.  During this visit, I contacted office of Dr. Serge Metz and Bart Hong and retrieved office notes (scanned to EMR)

## 2023-02-09 NOTE — CARDIOLOGY SUMMARY
[de-identified] : 2/8/23, electronic ventricular pacemaker with complete ventricular pacing at 74 bpm. QRSd 140 ms. Prior ECG 7/14/21 showed electronic ventricular pacemaker with complete ventricular pacing at 60 bpm. QRSd 142 ms. Rhythm / RR - interval analysis 2/7/23 observed 103 beats over 90 sec with mean HR 71 bpm; mean  ms (826-852); Max/Min 103%; RR SD 4 and RR CV 0%. There were no PVCs and no PACs [de-identified] : 10/5/2020, the left ventricle was normal in size. There was no evidence of infarction or inducible ischemia. There was a small mild defect in the septum on both stress and rest images. The observed defect was most likely due to either 100% paced beats with LBBB morphology or due to RV pressure / volume overload. Post-stress gated wall motion analysis was performed (LVEF= 67 %;LVEDV = 78 ml.), revealing normal LV function.There was paradoxical motion of the septum which is likely due to ventricular pacing, but may in part be due to pressure / volume overload from the RV.  The RV appeared enlarged and mildly diffusely hypocontractile [de-identified] : 3/17/21, mitral annular calcification, otherwise normal mitral valve with mild regurgitation. The aortic root appeared normal. The left atrium was severely dilated with volume index 68 cc/sqm. The LV appeared normal in size and wall thickness, with grossly normal systolic function, LVEF 66%. There was paradoxical motion of the septum, consistent with ventricular pacing. The RA was severely enlarged. The RV was enlarged, with normal systolic function. The tricuspid valve appeared normal with moderate regurgitation. The pulmonic valve appeared normal, with minimal regurgitation. The pericardium appeared normal, with no effusion. There were no significant changes compared to prior study 10/7/2020 [de-identified] : CT chest: 2/17/21, 4 mm noncalcified pulmonary nodules are stable since 2/2020. There is mild centrilobular emphysema, chronic bronchitis and mild cylindrical bronchiectasis with new small layering right pleural effusion. There was cardiomegaly with biatrial dilatation, calcific atherosclerotic CAD and 4.4 cm ascending aortic aneurysm, which may represent poststenotic dilatation given calcified aortic valve leaflets (note normal aortic root on above TTE). There was new ascites and 3 mm nonobstructing left renal calculus [de-identified] : PET, 6/14/21, there is no evidence of of myocardial ischemia or scarring. Values for regional myocardial flow reserve were normal in all coronary territories. Myocardial flow reserve (Normal > 2.0) LAD 2.85; LCX 2.55; RCA 2.95; LV global 2.76. There was normal LV cavity size and normal wall motion with LVEF 75%. [de-identified] : 2/7/23, Medtronic [id number] Loli XT SR MRI implanted 3/11/19, as prior generator battery was RYAN. VVIR mode with lower rate 60 ppm and upper sensor rate 120 ppm. ADL (Activities of Daily Living) Rate, the average target rate that the patient achieves for moderate activities, is 95 bpm. PPM battery with remaining longevity 9.9 years. Routine remote monitoring transmission noted all measured data WNL. Stored data revealed no new events [de-identified] : 10/1/04, mild diffuse LAD disease; 100% stenosis of D1 branch of LAD; mild diffuse disease of OM1 branch of LCX; mild diffuse RCA disease; mild to moderate global LV dysfunction with LVEF 45%

## 2023-02-09 NOTE — REVIEW OF SYSTEMS
[Negative] : Heme/Lymph [Weight Loss (___ Lbs)] : [unfilled] ~Ulb weight loss [Abdominal Pain] : abdominal pain [Fever] : no fever [Headache] : no headache [Weight Gain (___ Lbs)] : no recent weight gain [Chills] : no chills [Feeling Fatigued] : not feeling fatigued [FreeTextEntry2] : see HPI [FreeTextEntry7] : see HPI

## 2023-02-16 ENCOUNTER — OUTPATIENT (OUTPATIENT)
Dept: OUTPATIENT SERVICES | Facility: HOSPITAL | Age: 78
LOS: 1 days | End: 2023-02-16
Payer: MEDICARE

## 2023-02-16 ENCOUNTER — APPOINTMENT (OUTPATIENT)
Dept: CV DIAGNOSITCS | Facility: HOSPITAL | Age: 78
End: 2023-02-16

## 2023-02-16 DIAGNOSIS — Z98.890 OTHER SPECIFIED POSTPROCEDURAL STATES: ICD-10-CM

## 2023-02-16 DIAGNOSIS — I50.30 UNSPECIFIED DIASTOLIC (CONGESTIVE) HEART FAILURE: ICD-10-CM

## 2023-02-16 DIAGNOSIS — Z85.038 PERSONAL HISTORY OF OTHER MALIGNANT NEOPLASM OF LARGE INTESTINE: Chronic | ICD-10-CM

## 2023-02-16 DIAGNOSIS — Z95.0 PRESENCE OF CARDIAC PACEMAKER: Chronic | ICD-10-CM

## 2023-02-16 PROCEDURE — 93306 TTE W/DOPPLER COMPLETE: CPT | Mod: 26

## 2023-02-17 ENCOUNTER — NON-APPOINTMENT (OUTPATIENT)
Age: 78
End: 2023-02-17

## 2023-04-03 ENCOUNTER — NON-APPOINTMENT (OUTPATIENT)
Age: 78
End: 2023-04-03

## 2023-04-03 VITALS — WEIGHT: 166 LBS | HEIGHT: 67 IN | BODY MASS INDEX: 26.06 KG/M2

## 2023-04-03 DIAGNOSIS — I51.7 CARDIOMEGALY: ICD-10-CM

## 2023-04-03 DIAGNOSIS — I25.5 ISCHEMIC CARDIOMYOPATHY: ICD-10-CM

## 2023-04-03 DIAGNOSIS — D69.6 THROMBOCYTOPENIA, UNSPECIFIED: ICD-10-CM

## 2023-04-03 DIAGNOSIS — D75.1 SECONDARY POLYCYTHEMIA: ICD-10-CM

## 2023-05-09 ENCOUNTER — NON-APPOINTMENT (OUTPATIENT)
Age: 78
End: 2023-05-09

## 2023-05-10 ENCOUNTER — APPOINTMENT (OUTPATIENT)
Dept: ELECTROPHYSIOLOGY | Facility: CLINIC | Age: 78
End: 2023-05-10
Payer: MEDICARE

## 2023-05-10 PROCEDURE — 93296 REM INTERROG EVL PM/IDS: CPT

## 2023-05-10 PROCEDURE — 93294 REM INTERROG EVL PM/LDLS PM: CPT

## 2023-08-10 ENCOUNTER — RESULT CHARGE (OUTPATIENT)
Age: 78
End: 2023-08-10

## 2023-08-11 ENCOUNTER — APPOINTMENT (OUTPATIENT)
Dept: ELECTROPHYSIOLOGY | Facility: CLINIC | Age: 78
End: 2023-08-11
Payer: MEDICARE

## 2023-08-11 ENCOUNTER — NON-APPOINTMENT (OUTPATIENT)
Age: 78
End: 2023-08-11

## 2023-08-11 PROCEDURE — 93282 PRGRMG EVAL IMPLANTABLE DFB: CPT

## 2023-12-07 ENCOUNTER — APPOINTMENT (OUTPATIENT)
Dept: ELECTROPHYSIOLOGY | Facility: CLINIC | Age: 78
End: 2023-12-07

## 2023-12-20 ENCOUNTER — APPOINTMENT (OUTPATIENT)
Dept: ELECTROPHYSIOLOGY | Facility: CLINIC | Age: 78
End: 2023-12-20
Payer: MEDICARE

## 2023-12-20 ENCOUNTER — NON-APPOINTMENT (OUTPATIENT)
Age: 78
End: 2023-12-20

## 2023-12-20 PROCEDURE — 93296 REM INTERROG EVL PM/IDS: CPT

## 2023-12-20 PROCEDURE — 93294 REM INTERROG EVL PM/LDLS PM: CPT

## 2024-01-25 NOTE — REVIEW OF SYSTEMS
[Weight Loss (___ Lbs)] : [unfilled] ~Ulb weight loss [Abdominal Pain] : abdominal pain [Negative] : Heme/Lymph [Fever] : no fever [Headache] : no headache [Weight Gain (___ Lbs)] : no recent weight gain [Chills] : no chills [Feeling Fatigued] : not feeling fatigued [FreeTextEntry2] : see HPI [FreeTextEntry7] : see HPI

## 2024-01-25 NOTE — PHYSICAL EXAM
[General Appearance - Well Developed] : well developed [Normal Appearance] : normal appearance [Well Groomed] : well groomed [General Appearance - Well Nourished] : well nourished [No Deformities] : no deformities [General Appearance - In No Acute Distress] : no acute distress [Normal Conjunctiva] : the conjunctiva exhibited no abnormalities [Eyelids - No Xanthelasma] : the eyelids demonstrated no xanthelasmas [Normal Oral Mucosa] : normal oral mucosa [No Oral Pallor] : no oral pallor [No Oral Cyanosis] : no oral cyanosis [Normal Jugular Venous A Waves Present] : normal jugular venous A waves present [Normal Jugular Venous V Waves Present] : normal jugular venous V waves present [No Jugular Venous Burgos A Waves] : no jugular venous burgos A waves [Respiration, Rhythm And Depth] : normal respiratory rhythm and effort [Exaggerated Use Of Accessory Muscles For Inspiration] : no accessory muscle use [Scattered Wheezes] : scattered wheezing [Auscultation Breath Sounds / Voice Sounds] : lungs were clear to auscultation bilaterally [Abdomen Soft] : soft [Abdomen Tenderness] : non-tender [Abdomen Mass (___ Cm)] : no abdominal mass palpated [Abnormal Walk] : normal gait [Gait - Sufficient For Exercise Testing] : the gait was sufficient for exercise testing [Nail Clubbing] : no clubbing of the fingernails [Cyanosis, Localized] : no localized cyanosis [Petechial Hemorrhages (___cm)] : no petechial hemorrhages [Skin Color & Pigmentation] : normal skin color and pigmentation [No Venous Stasis] : no venous stasis [No Skin Ulcers] : no skin ulcer [No Xanthoma] : no  xanthoma was observed [Smooth] : with smooth borders [Multiple Papules] : multiple [Black] : black [Brown] : brown [Multiple] : multiple [Oriented To Time, Place, And Person] : oriented to person, place, and time [Affect] : the affect was normal [Mood] : the mood was normal [No Anxiety] : not feeling anxious [5th Left ICS - MCL] : palpated at the 5th LICS in the midclavicular line [Diffuse] : diffuse [No Precordial Heave] : no precordial heave was noted [Normal Rate] : normal [Rhythm Regular] : regular [Normal S1] : normal S1 [S4] : an S4 was heard [II] : a grade 2 [Constant] : constant [Medium] : medium pitched [Blowing] : blowing [Early] : early [Axilla] : the murmur was transmitted to the axilla [] : decreases with inspiration [2+] : left 2+ [No Abnormalities] : the abdominal aorta was not enlarged and no bruit was heard [No Pitting Edema] : no pitting edema present [Rt] : varicose veins of the right leg noted [Lt] : varicose veins of the left leg noted [Prolonged Exp Time] : with normal expiratory time [Increased E/I Ratio] : with normal expiratory/inspiratory ratio  [FreeTextEntry1] : L ankle circumf 8.5"; right ankle 8.5"  (previously 9.0" and 10.0" respectively).  [Apical Thrill] : no thrill palpable at the apex [Click] : no click [Pericardial Rub] : no pericardial rub [Right Carotid Bruit] : no bruit heard over the right carotid [Left Carotid Bruit] : no bruit heard over the left carotid [Right Femoral Bruit] : no bruit heard over the right femoral artery [Left Femoral Bruit] : no bruit heard over the left femoral artery

## 2024-01-25 NOTE — HISTORY OF PRESENT ILLNESS
[FreeTextEntry1] : Remi Mccoy has h/o 1V CAD, PPM  (Medtronic TGV008 VVI mode) for afib with slow ventricular response with 4 sec pause, HTN with mild orthostatic hypotension and albuminuria, HFpEF,  s/p  tricuspid repair  3/16/22, MitralClip 22, HLD, former cigarette smoker, BPH, acquired polycythemia, thrombocytopenia, benign paroxysmal position vertigo, gallstones, glaucoma and hiatal hernia with GERD. He was initially seen 08 with stenosis of  D1 branch of LAD with moderate diffuse LV dysfunction, no PCI. On 08, he presented to ED with chest pain and /120 mm Hg, treated with IV NTG and sent home after undergoing stress test. In 2012, he used doxazosin only when BP was elevated. He stopped Imdur. On 12 he developed sudden warmth, dizziness and N/V while driving, no LOC. He had occasional one-two ounce whiskey or cognac. He was not smoking. 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 He underwent surgical resection 14; no additional treatment required. By 2/11/15, he noted discomfort from right inguinal hernia and small midline abdominal hernia.  He was in the ED 16 with nasal bleeding; INR ~2.0. He was hospitalized for one week in Oct 2016 for pneumonia. By 17, self-obtained SBP frequently elevated. PPM generator RYAN changed 3/11/19. He was diagnosed with acute cholecystitis with gallstones and underwent laparoscopic cholecystectomy with simultaneous repair of small right periumbilical incisional hernia 19. By 20, he had recurrent bilateral inguinal hernias, L > R s/p repair 20. He stopped rosuvastatin on his own in Aug 2020, though no side effects. On 21, he p/w sense of fullness in abdomen after eating, pushing upwards towards the chest, resulting in inability to take deep breath. CT abd / chest reported no significant abnormalities other than poss small pleural effusion. Furosemide 20 mg daily was started. He went to [facility] 21 with chest pain and self /115 mmHg . Cardiac PET normal perfusion and MFR. He presented to [facility] 2022 with acute SOB and was transferred to [facility] for assessment of valvular disease. He underwent robotic repair of tricuspid valve (Juniro Diego MD) at [facility] 3/16/22 and then MitralClip (Patrick James MD) 22. He was seen [facility] ED 2023 with recurrent SOB. By 23, he lost 31 lbs, noting improved endurance and no recent . amlodipine and quinapril were discontinued as BP decreased with weight loss. Apixaban was decreased from 5 to 2.5 mg twice daily. He consumed one drink infrequently.

## 2024-01-25 NOTE — DISCUSSION/SUMMARY
[Patient] : the patient [Minutes Spent: ___] : for [unfilled] ~Uminutes [With Me] : with me [___ Month(s)] : in [unfilled] month(s) [FreeTextEntry1] : \par  WEIGHT GOALS:\par  \par  Category				BMI range - kg/m2	BMI Prime\par  Very severely underweight		less than 15		less than 0.60	\par  Severely underweight			from 15.0 to 16.0		from 0.60 to 0.64	\par  Underweight				from 16.0 to 18.5		from 0.64 to 0.74	\par  Normal (healthy weight)			from 18.5 to 25		from 0.74 to 1.0	\par  Overweight				from 25 to 30		from 1.0 to 1.2	\par  Obese Class I (Moderately obese)		from 30 to 35		from 1.2 to 1.4	\par  Obese Class II (Severely obese)		from 35 to 40		from 1.4 to 1.6	\par  Obese Class III (Very severely obese)	over 40			over 1.6	\par  \par  Your current weight is 164 lbs (171 lbs on 7/14/21, 181 lbs. 02/28/20) Given current weight and height 5'7", your calculated body mass index (BMI) is 25.7 kg/sqm. Normal BMI is 18.5-25 kg/sqm. Thus current weight is in the borderline overweight category. Abdominal waist circumference is measured at the level of the umbilicus and is thus not a pants waist measurement. Your current abdominal waist circumference is 38" (40" on 7/14/21). Normal abdominal waist circumference is < 32 inches for women and < 37 inches for men\par  \par  MEDITERRANEAN DIET:\par  The Mediterranean diet does not limit calories. Instead, it provides guidelines for what foods you should eat more of and what foods you should eat less of. \par  Every day: \par  - Eat several servings of plant foods. These include fruits and vegetables, potatoes, breads and whole grains, beans, nuts, and seeds. As much as you can, eat fresh rather than canned or frozen foods. \par  - Use olive oil for cooking and salad dressings. You can have up to 35% of your calories from fats, including olive and other vegetable oils. However, limit the amount you eat of saturated fats like butter, margarine, lard, and animal fat. They should add up to no more than 8 percent of your total calories. Include "partially hydrogenated" oils in your saturated fat limit. \par  - Every day, have a small amount of cheese and yogurt. Look for low-fat and non-fat types. Think of cheese as a condiment like salt or pepper, not a main dish. For example, you can sprinkle a little Parmesan cheese on pasta or put a little cheese in a salad. Cheese contains saturated fat. Mozzarella cheese is much lower in fat than most types of cheese. \par  - If alcohol is not a problem for you, have one (for women) or two (for men) glasses of red wine with dinner. If alcohol is not a good idea for you, try adding grape juice to your diet instead. It gives you the same heart-protecting benefits of wine without alcohol. It is very sweet, so you might use it for your "dessert." \par  Every week: \par  - Eat some fish and poultry. Do not eat chicken or turkey skin. \par  - Eat no more than 4 eggs a week, including eggs used in cooking. Egg yolks contain saturated fat. \par  - Eat no more than 2 or 3 sweets or desserts that contain lots of sugar or honey. Try fresh fruit or sweets made with fruit concentrate instead. \par  Every month: \par  - Eat no more than 12 to 16 ounces of red meat. Lean meat is preferable

## 2024-01-25 NOTE — HISTORY OF PRESENT ILLNESS
[FreeTextEntry1] : Remi Mccoy has h/o 1V CAD, PPM  (Medtronic OTZ027 VVI mode) for afib with slow ventricular response with 4 sec pause, HTN with mild orthostatic hypotension and albuminuria, HFpEF,  s/p  tricuspid repair  3/16/22, MitralClip 22, HLD, former cigarette smoker, BPH, acquired polycythemia, thrombocytopenia, benign paroxysmal position vertigo, gallstones, glaucoma and hiatal hernia with GERD. He was initially seen 08 with stenosis of  D1 branch of LAD with moderate diffuse LV dysfunction, no PCI. On 08, he presented to ED with chest pain and /120 mm Hg, treated with IV NTG and sent home after undergoing stress test. In 2012, he used doxazosin only when BP was elevated. He stopped Imdur. On 12 he developed sudden warmth, dizziness and N/V while driving, no LOC. He had occasional one-two ounce whiskey or cognac. He was not smoking. 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 He underwent surgical resection 14; no additional treatment required. By 2/11/15, he noted discomfort from right inguinal hernia and small midline abdominal hernia.  He was in the ED 16 with nasal bleeding; INR ~2.0. He was hospitalized for one week in Oct 2016 for pneumonia. By 17, self-obtained SBP frequently elevated. PPM generator RYAN changed 3/11/19. He was diagnosed with acute cholecystitis with gallstones and underwent laparoscopic cholecystectomy with simultaneous repair of small right periumbilical incisional hernia 19. By 20, he had recurrent bilateral inguinal hernias, L > R s/p repair 20. He stopped rosuvastatin on his own in Aug 2020, though no side effects. On 21, he p/w sense of fullness in abdomen after eating, pushing upwards towards the chest, resulting in inability to take deep breath. CT abd / chest reported no significant abnormalities other than poss small pleural effusion. Furosemide 20 mg daily was started. He went to [facility] 21 with chest pain and self /115 mmHg . Cardiac PET normal perfusion and MFR. He presented to [facility] 2022 with acute SOB and was transferred to [facility] for assessment of valvular disease. He underwent robotic repair of tricuspid valve (Junior Diego MD) at [facility] 3/16/22 and then MitralClip (Patrick James MD) 22. He was seen [facility] ED 2023 with recurrent SOB. By 23, he lost 31 lbs, noting improved endurance and no recent . amlodipine and quinapril were discontinued as BP decreased with weight loss. Apixaban was decreased from 5 to 2.5 mg twice daily. He consumed one drink infrequently.

## 2024-01-25 NOTE — PHYSICAL EXAM
[General Appearance - Well Developed] : well developed [Normal Appearance] : normal appearance [Well Groomed] : well groomed [General Appearance - Well Nourished] : well nourished [No Deformities] : no deformities [General Appearance - In No Acute Distress] : no acute distress [Normal Conjunctiva] : the conjunctiva exhibited no abnormalities [Eyelids - No Xanthelasma] : the eyelids demonstrated no xanthelasmas [Normal Oral Mucosa] : normal oral mucosa [No Oral Pallor] : no oral pallor [No Oral Cyanosis] : no oral cyanosis [Normal Jugular Venous A Waves Present] : normal jugular venous A waves present [Normal Jugular Venous V Waves Present] : normal jugular venous V waves present [No Jugular Venous [place] A Waves] : no jugular venous burgos A waves [Respiration, Rhythm And Depth] : normal respiratory rhythm and effort [Exaggerated Use Of Accessory Muscles For Inspiration] : no accessory muscle use [Auscultation Breath Sounds / Voice Sounds] : lungs were clear to auscultation bilaterally [Scattered Wheezes] : scattered wheezing [Abdomen Soft] : soft [Abdomen Tenderness] : non-tender [Abdomen Mass (___ Cm)] : no abdominal mass palpated [Abnormal Walk] : normal gait [Gait - Sufficient For Exercise Testing] : the gait was sufficient for exercise testing [Nail Clubbing] : no clubbing of the fingernails [Cyanosis, Localized] : no localized cyanosis [Petechial Hemorrhages (___cm)] : no petechial hemorrhages [Skin Color & Pigmentation] : normal skin color and pigmentation [No Venous Stasis] : no venous stasis [No Skin Ulcers] : no skin ulcer [No Xanthoma] : no  xanthoma was observed [Smooth] : with smooth borders [Multiple Papules] : multiple [Black] : black [Brown] : brown [Multiple] : multiple [Affect] : the affect was normal [Oriented To Time, Place, And Person] : oriented to person, place, and time [Mood] : the mood was normal [No Anxiety] : not feeling anxious [5th Left ICS - MCL] : palpated at the 5th LICS in the midclavicular line [Diffuse] : diffuse [No Precordial Heave] : no precordial heave was noted [Normal Rate] : normal [Rhythm Regular] : regular [Normal S1] : normal S1 [S4] : an S4 was heard [II] : a grade 2 [Constant] : constant [Medium] : medium pitched [Blowing] : blowing [Early] : early [Axilla] : the murmur was transmitted to the axilla [] : decreases with inspiration [2+] : left 2+ [No Abnormalities] : the abdominal aorta was not enlarged and no bruit was heard [No Pitting Edema] : no pitting edema present [Rt] : varicose veins of the right leg noted [Lt] : varicose veins of the left leg noted [Prolonged Exp Time] : with normal expiratory time [Increased E/I Ratio] : with normal expiratory/inspiratory ratio  [FreeTextEntry1] : L ankle circumf 8.5"; right ankle 8.5"  (previously 9.0" and 10.0" respectively).  [Apical Thrill] : no thrill palpable at the apex [Click] : no click [Pericardial Rub] : no pericardial rub [Right Carotid Bruit] : no bruit heard over the right carotid [Left Carotid Bruit] : no bruit heard over the left carotid [Right Femoral Bruit] : no bruit heard over the right femoral artery [Left Femoral Bruit] : no bruit heard over the left femoral artery

## 2024-01-25 NOTE — CARDIOLOGY SUMMARY
[de-identified] : 1/26/24, [de-identified] : 10/5/2020, the left ventricle was normal in size. There was no evidence of infarction or inducible ischemia. There was a small mild defect in the septum on both stress and rest images. The observed defect was most likely due to either 100% paced beats with LBBB morphology or due to RV pressure / volume overload. Post-stress gated wall motion analysis was performed (LVEF= 67 %;LVEDV = 78 ml.), revealing normal LV function.There was paradoxical motion of the septum which is likely due to ventricular pacing, but may in part be due to pressure / volume overload from the RV.  The RV appeared enlarged and mildly diffusely hypocontractile [de-identified] : 3/17/21, mitral annular calcification, otherwise normal mitral valve with mild regurgitation. The aortic root appeared normal. The left atrium was severely dilated with volume index 68 cc/sqm. The LV appeared normal in size and wall thickness, with grossly normal systolic function, LVEF 66%. There was paradoxical motion of the septum, consistent with ventricular pacing. The RA was severely enlarged. The RV was enlarged, with normal systolic function. The tricuspid valve appeared normal with moderate regurgitation. The pulmonic valve appeared normal, with minimal regurgitation. The pericardium appeared normal, with no effusion. There were no significant changes compared to prior study 10/7/2020 [de-identified] : CT chest: 2/17/21, 4 mm noncalcified pulmonary nodules are stable since 2/2020. There is mild centrilobular emphysema, chronic bronchitis and mild cylindrical bronchiectasis with new small layering right pleural effusion. There was cardiomegaly with biatrial dilatation, calcific atherosclerotic CAD and 4.4 cm ascending aortic aneurysm, which may represent poststenotic dilatation given calcified aortic valve leaflets (note normal aortic root on above TTE). There was new ascites and 3 mm nonobstructing left renal calculus [de-identified] : PET, 6/14/21, there is no evidence of of myocardial ischemia or scarring. Values for regional myocardial flow reserve were normal in all coronary territories. Myocardial flow reserve (Normal > 2.0) LAD 2.85; LCX 2.55; RCA 2.95; LV global 2.76. There was normal LV cavity size and normal wall motion with LVEF 75%. [de-identified] : 2/7/23, Medtronic [id number] Loli XT SR MRI implanted 3/11/19, as prior generator battery was RYAN. VVIR mode with lower rate 60 ppm and upper sensor rate 120 ppm. ADL (Activities of Daily Living) Rate, the average target rate that the patient achieves for moderate activities, is 95 bpm. PPM battery with remaining longevity 9.9 years. Routine remote monitoring transmission noted all measured data WNL. Stored data revealed no new events [de-identified] : 10/1/04, mild diffuse LAD disease; 100% stenosis of D1 branch of LAD; mild diffuse disease of OM1 branch of LCX; mild diffuse RCA disease; mild to moderate global LV dysfunction with LVEF 45%

## 2024-01-25 NOTE — ADDENDUM
[FreeTextEntry1] : I spent 60 minutes face to face time with the patient, from 15:30 to 16:30 on 1/26/24. Thirty minutes were devoted to patient counseling as outlined above in the End of Visit section.  Prior to this visit, I contacted office of Dr. Yuan Baxter and obtained labs 10/13/23. Pre-visit prep time was 10 minutes. Thus total visit time was 70 minutes.

## 2024-01-25 NOTE — CARDIOLOGY SUMMARY
[de-identified] : 1/26/24, [de-identified] : 10/5/2020, the left ventricle was normal in size. There was no evidence of infarction or inducible ischemia. There was a small mild defect in the septum on both stress and rest images. The observed defect was most likely due to either 100% paced beats with LBBB morphology or due to RV pressure / volume overload. Post-stress gated wall motion analysis was performed (LVEF= 67 %;LVEDV = 78 ml.), revealing normal LV function.There was paradoxical motion of the septum which is likely due to ventricular pacing, but may in part be due to pressure / volume overload from the RV.  The RV appeared enlarged and mildly diffusely hypocontractile [de-identified] : 3/17/21, mitral annular calcification, otherwise normal mitral valve with mild regurgitation. The aortic root appeared normal. The left atrium was severely dilated with volume index 68 cc/sqm. The LV appeared normal in size and wall thickness, with grossly normal systolic function, LVEF 66%. There was paradoxical motion of the septum, consistent with ventricular pacing. The RA was severely enlarged. The RV was enlarged, with normal systolic function. The tricuspid valve appeared normal with moderate regurgitation. The pulmonic valve appeared normal, with minimal regurgitation. The pericardium appeared normal, with no effusion. There were no significant changes compared to prior study 10/7/2020 [de-identified] : CT chest: 2/17/21, 4 mm noncalcified pulmonary nodules are stable since 2/2020. There is mild centrilobular emphysema, chronic bronchitis and mild cylindrical bronchiectasis with new small layering right pleural effusion. There was cardiomegaly with biatrial dilatation, calcific atherosclerotic CAD and 4.4 cm ascending aortic aneurysm, which may represent poststenotic dilatation given calcified aortic valve leaflets (note normal aortic root on above TTE). There was new ascites and 3 mm nonobstructing left renal calculus [de-identified] : PET, 6/14/21, there is no evidence of of myocardial ischemia or scarring. Values for regional myocardial flow reserve were normal in all coronary territories. Myocardial flow reserve (Normal > 2.0) LAD 2.85; LCX 2.55; RCA 2.95; LV global 2.76. There was normal LV cavity size and normal wall motion with LVEF 75%. [de-identified] : 2/7/23, Medtronic [id number] Loli XT SR MRI implanted 3/11/19, as prior generator battery was RYAN. VVIR mode with lower rate 60 ppm and upper sensor rate 120 ppm. ADL (Activities of Daily Living) Rate, the average target rate that the patient achieves for moderate activities, is 95 bpm. PPM battery with remaining longevity 9.9 years. Routine remote monitoring transmission noted all measured data WNL. Stored data revealed no new events [de-identified] : 10/1/04, mild diffuse LAD disease; 100% stenosis of D1 branch of LAD; mild diffuse disease of OM1 branch of LCX; mild diffuse RCA disease; mild to moderate global LV dysfunction with LVEF 45%

## 2024-01-25 NOTE — REVIEW OF SYSTEMS
[Weight Loss (___ Lbs)] : [unfilled] ~Ulb weight loss [Abdominal Pain] : abdominal pain [Negative] : Psychiatric [Fever] : no fever [Headache] : no headache [Weight Gain (___ Lbs)] : no recent weight gain [Chills] : no chills [Feeling Fatigued] : not feeling fatigued [FreeTextEntry2] : see HPI [FreeTextEntry7] : see HPI

## 2024-01-26 ENCOUNTER — APPOINTMENT (OUTPATIENT)
Dept: CARDIOLOGY | Facility: CLINIC | Age: 79
End: 2024-01-26
Payer: MEDICARE

## 2024-01-26 ENCOUNTER — NON-APPOINTMENT (OUTPATIENT)
Age: 79
End: 2024-01-26

## 2024-01-26 VITALS — HEART RATE: 71 BPM | SYSTOLIC BLOOD PRESSURE: 119 MMHG | DIASTOLIC BLOOD PRESSURE: 68 MMHG | OXYGEN SATURATION: 96 %

## 2024-01-26 VITALS
SYSTOLIC BLOOD PRESSURE: 119 MMHG | OXYGEN SATURATION: 97 % | RESPIRATION RATE: 16 BRPM | WEIGHT: 168 LBS | HEART RATE: 84 BPM | HEIGHT: 67 IN | DIASTOLIC BLOOD PRESSURE: 69 MMHG | BODY MASS INDEX: 26.37 KG/M2

## 2024-01-26 VITALS — HEART RATE: 74 BPM | SYSTOLIC BLOOD PRESSURE: 117 MMHG | OXYGEN SATURATION: 96 % | DIASTOLIC BLOOD PRESSURE: 67 MMHG

## 2024-01-26 VITALS — DIASTOLIC BLOOD PRESSURE: 67 MMHG | OXYGEN SATURATION: 96 % | SYSTOLIC BLOOD PRESSURE: 119 MMHG | HEART RATE: 75 BPM

## 2024-01-26 VITALS — SYSTOLIC BLOOD PRESSURE: 118 MMHG | HEART RATE: 70 BPM | OXYGEN SATURATION: 96 % | DIASTOLIC BLOOD PRESSURE: 67 MMHG

## 2024-01-26 VITALS — SYSTOLIC BLOOD PRESSURE: 120 MMHG | HEART RATE: 73 BPM | OXYGEN SATURATION: 97 % | DIASTOLIC BLOOD PRESSURE: 68 MMHG

## 2024-01-26 DIAGNOSIS — I25.10 ATHEROSCLEROTIC HEART DISEASE OF NATIVE CORONARY ARTERY W/OUT ANGINA PECTORIS: ICD-10-CM

## 2024-01-26 DIAGNOSIS — I48.19 OTHER PERSISTENT ATRIAL FIBRILLATION: ICD-10-CM

## 2024-01-26 DIAGNOSIS — I50.30 UNSPECIFIED DIASTOLIC (CONGESTIVE) HEART FAILURE: ICD-10-CM

## 2024-01-26 DIAGNOSIS — Z71.3 DIETARY COUNSELING AND SURVEILLANCE: ICD-10-CM

## 2024-01-26 DIAGNOSIS — I10 ESSENTIAL (PRIMARY) HYPERTENSION: ICD-10-CM

## 2024-01-26 DIAGNOSIS — Z79.899 OTHER LONG TERM (CURRENT) DRUG THERAPY: ICD-10-CM

## 2024-01-26 DIAGNOSIS — I95.1 ORTHOSTATIC HYPOTENSION: ICD-10-CM

## 2024-01-26 DIAGNOSIS — Z98.890 OTHER SPECIFIED POSTPROCEDURAL STATES: ICD-10-CM

## 2024-01-26 DIAGNOSIS — E78.5 HYPERLIPIDEMIA, UNSPECIFIED: ICD-10-CM

## 2024-01-26 DIAGNOSIS — Z95.818 OTHER SPECIFIED POSTPROCEDURAL STATES: ICD-10-CM

## 2024-01-26 PROCEDURE — 93000 ELECTROCARDIOGRAM COMPLETE: CPT

## 2024-01-26 PROCEDURE — 99215 OFFICE O/P EST HI 40 MIN: CPT

## 2024-01-26 PROCEDURE — G2211 COMPLEX E/M VISIT ADD ON: CPT

## 2024-01-26 PROCEDURE — 93040 RHYTHM ECG WITH REPORT: CPT | Mod: 59

## 2024-01-26 RX ORDER — FLUTICASONE PROPIONATE 50 UG/1
50 SPRAY, METERED NASAL DAILY
Qty: 1 | Refills: 3 | Status: DISCONTINUED | COMMUNITY
Start: 2023-02-08 | End: 2024-01-26

## 2024-01-26 RX ORDER — MELATONIN 5 MG
5 CAPSULE ORAL
Qty: 90 | Refills: 3 | Status: DISCONTINUED | COMMUNITY
Start: 2023-02-08 | End: 2024-01-26

## 2024-01-26 RX ORDER — ROSUVASTATIN CALCIUM 10 MG/1
10 TABLET, FILM COATED ORAL
Qty: 90 | Refills: 3 | Status: DISCONTINUED | COMMUNITY
Start: 2019-09-16 | End: 2024-01-26

## 2024-03-20 ENCOUNTER — APPOINTMENT (OUTPATIENT)
Dept: ELECTROPHYSIOLOGY | Facility: CLINIC | Age: 79
End: 2024-03-20
Payer: MEDICARE

## 2024-03-20 ENCOUNTER — NON-APPOINTMENT (OUTPATIENT)
Age: 79
End: 2024-03-20

## 2024-03-20 PROCEDURE — 93296 REM INTERROG EVL PM/IDS: CPT

## 2024-03-20 PROCEDURE — 93294 REM INTERROG EVL PM/LDLS PM: CPT

## 2024-06-18 ENCOUNTER — NON-APPOINTMENT (OUTPATIENT)
Age: 79
End: 2024-06-18

## 2024-06-19 ENCOUNTER — APPOINTMENT (OUTPATIENT)
Dept: ELECTROPHYSIOLOGY | Facility: CLINIC | Age: 79
End: 2024-06-19
Payer: MEDICARE

## 2024-06-19 PROCEDURE — 93294 REM INTERROG EVL PM/LDLS PM: CPT

## 2024-06-19 PROCEDURE — 93296 REM INTERROG EVL PM/IDS: CPT

## 2024-07-17 ENCOUNTER — APPOINTMENT (OUTPATIENT)
Dept: CARDIOLOGY | Facility: CLINIC | Age: 79
End: 2024-07-17

## 2024-07-17 ENCOUNTER — NON-APPOINTMENT (OUTPATIENT)
Age: 79
End: 2024-07-17

## 2024-07-17 VITALS — DIASTOLIC BLOOD PRESSURE: 65 MMHG | HEART RATE: 71 BPM | OXYGEN SATURATION: 96 % | SYSTOLIC BLOOD PRESSURE: 113 MMHG

## 2024-07-17 VITALS
WEIGHT: 163 LBS | SYSTOLIC BLOOD PRESSURE: 117 MMHG | HEART RATE: 74 BPM | RESPIRATION RATE: 16 BRPM | HEIGHT: 67 IN | OXYGEN SATURATION: 96 % | DIASTOLIC BLOOD PRESSURE: 67 MMHG | BODY MASS INDEX: 25.58 KG/M2

## 2024-07-17 VITALS — OXYGEN SATURATION: 95 % | DIASTOLIC BLOOD PRESSURE: 68 MMHG | SYSTOLIC BLOOD PRESSURE: 129 MMHG | HEART RATE: 71 BPM

## 2024-07-17 VITALS — OXYGEN SATURATION: 95 % | SYSTOLIC BLOOD PRESSURE: 110 MMHG | HEART RATE: 71 BPM | DIASTOLIC BLOOD PRESSURE: 65 MMHG

## 2024-07-17 VITALS — HEART RATE: 71 BPM | DIASTOLIC BLOOD PRESSURE: 67 MMHG | SYSTOLIC BLOOD PRESSURE: 122 MMHG | OXYGEN SATURATION: 95 %

## 2024-07-17 DIAGNOSIS — Z98.890 OTHER SPECIFIED POSTPROCEDURAL STATES: ICD-10-CM

## 2024-07-17 DIAGNOSIS — R07.89 OTHER CHEST PAIN: ICD-10-CM

## 2024-07-17 DIAGNOSIS — E78.5 HYPERLIPIDEMIA, UNSPECIFIED: ICD-10-CM

## 2024-07-17 DIAGNOSIS — I51.7 CARDIOMEGALY: ICD-10-CM

## 2024-07-17 DIAGNOSIS — R53.83 OTHER FATIGUE: ICD-10-CM

## 2024-07-17 DIAGNOSIS — E66.3 OVERWEIGHT: ICD-10-CM

## 2024-07-17 DIAGNOSIS — R06.09 OTHER FORMS OF DYSPNEA: ICD-10-CM

## 2024-07-17 DIAGNOSIS — I49.5 SICK SINUS SYNDROME: ICD-10-CM

## 2024-07-17 DIAGNOSIS — I50.30 UNSPECIFIED DIASTOLIC (CONGESTIVE) HEART FAILURE: ICD-10-CM

## 2024-07-17 DIAGNOSIS — I95.1 ORTHOSTATIC HYPOTENSION: ICD-10-CM

## 2024-07-17 DIAGNOSIS — Z95.0 PRESENCE OF CARDIAC PACEMAKER: ICD-10-CM

## 2024-07-17 DIAGNOSIS — I25.10 ATHEROSCLEROTIC HEART DISEASE OF NATIVE CORONARY ARTERY W/OUT ANGINA PECTORIS: ICD-10-CM

## 2024-07-17 DIAGNOSIS — Z71.3 DIETARY COUNSELING AND SURVEILLANCE: ICD-10-CM

## 2024-07-17 DIAGNOSIS — I25.5 ISCHEMIC CARDIOMYOPATHY: ICD-10-CM

## 2024-07-17 DIAGNOSIS — I10 ESSENTIAL (PRIMARY) HYPERTENSION: ICD-10-CM

## 2024-07-17 DIAGNOSIS — R80.9 PROTEINURIA, UNSPECIFIED: ICD-10-CM

## 2024-07-17 DIAGNOSIS — I48.19 OTHER PERSISTENT ATRIAL FIBRILLATION: ICD-10-CM

## 2024-07-17 DIAGNOSIS — Z79.899 OTHER LONG TERM (CURRENT) DRUG THERAPY: ICD-10-CM

## 2024-07-17 DIAGNOSIS — Z95.818 OTHER SPECIFIED POSTPROCEDURAL STATES: ICD-10-CM

## 2024-07-17 PROCEDURE — 99215 OFFICE O/P EST HI 40 MIN: CPT

## 2024-07-17 PROCEDURE — 93040 RHYTHM ECG WITH REPORT: CPT | Mod: 59

## 2024-07-17 PROCEDURE — G2211 COMPLEX E/M VISIT ADD ON: CPT

## 2024-07-17 PROCEDURE — G2212 PROLONG OUTPT/OFFICE VIS: CPT

## 2024-07-17 PROCEDURE — 93000 ELECTROCARDIOGRAM COMPLETE: CPT

## 2024-08-13 ENCOUNTER — APPOINTMENT (OUTPATIENT)
Dept: ELECTROPHYSIOLOGY | Facility: CLINIC | Age: 79
End: 2024-08-13
Payer: MEDICARE

## 2024-08-13 ENCOUNTER — NON-APPOINTMENT (OUTPATIENT)
Age: 79
End: 2024-08-13

## 2024-08-13 VITALS — HEART RATE: 73 BPM | SYSTOLIC BLOOD PRESSURE: 120 MMHG | DIASTOLIC BLOOD PRESSURE: 72 MMHG

## 2024-08-13 DIAGNOSIS — I49.5 SICK SINUS SYNDROME: ICD-10-CM

## 2024-08-13 PROCEDURE — 93279 PRGRMG DEV EVAL PM/LDLS PM: CPT

## 2024-11-12 ENCOUNTER — NON-APPOINTMENT (OUTPATIENT)
Age: 79
End: 2024-11-12

## 2024-11-12 ENCOUNTER — APPOINTMENT (OUTPATIENT)
Dept: ELECTROPHYSIOLOGY | Facility: CLINIC | Age: 79
End: 2024-11-12
Payer: MEDICARE

## 2024-11-12 PROCEDURE — 93296 REM INTERROG EVL PM/IDS: CPT

## 2024-11-12 PROCEDURE — 93294 REM INTERROG EVL PM/LDLS PM: CPT

## 2025-01-29 ENCOUNTER — NON-APPOINTMENT (OUTPATIENT)
Age: 80
End: 2025-01-29

## 2025-01-29 ENCOUNTER — APPOINTMENT (OUTPATIENT)
Dept: CARDIOLOGY | Facility: CLINIC | Age: 80
End: 2025-01-29
Payer: MEDICARE

## 2025-01-29 VITALS — OXYGEN SATURATION: 98 % | SYSTOLIC BLOOD PRESSURE: 118 MMHG | DIASTOLIC BLOOD PRESSURE: 67 MMHG | HEART RATE: 84 BPM

## 2025-01-29 VITALS
DIASTOLIC BLOOD PRESSURE: 68 MMHG | WEIGHT: 166 LBS | OXYGEN SATURATION: 98 % | TEMPERATURE: 97.5 F | HEART RATE: 88 BPM | BODY MASS INDEX: 26.06 KG/M2 | SYSTOLIC BLOOD PRESSURE: 108 MMHG | HEIGHT: 67 IN | RESPIRATION RATE: 16 BRPM

## 2025-01-29 VITALS — SYSTOLIC BLOOD PRESSURE: 111 MMHG | OXYGEN SATURATION: 98 % | DIASTOLIC BLOOD PRESSURE: 58 MMHG | HEART RATE: 73 BPM

## 2025-01-29 VITALS — DIASTOLIC BLOOD PRESSURE: 65 MMHG | SYSTOLIC BLOOD PRESSURE: 115 MMHG | OXYGEN SATURATION: 97 % | HEART RATE: 78 BPM

## 2025-01-29 VITALS — SYSTOLIC BLOOD PRESSURE: 109 MMHG | OXYGEN SATURATION: 97 % | HEART RATE: 74 BPM | DIASTOLIC BLOOD PRESSURE: 60 MMHG

## 2025-01-29 DIAGNOSIS — Z95.0 PRESENCE OF CARDIAC PACEMAKER: ICD-10-CM

## 2025-01-29 DIAGNOSIS — I10 ESSENTIAL (PRIMARY) HYPERTENSION: ICD-10-CM

## 2025-01-29 DIAGNOSIS — Z98.890 OTHER SPECIFIED POSTPROCEDURAL STATES: ICD-10-CM

## 2025-01-29 DIAGNOSIS — Z95.818 OTHER SPECIFIED POSTPROCEDURAL STATES: ICD-10-CM

## 2025-01-29 DIAGNOSIS — I50.30 UNSPECIFIED DIASTOLIC (CONGESTIVE) HEART FAILURE: ICD-10-CM

## 2025-01-29 DIAGNOSIS — E78.5 HYPERLIPIDEMIA, UNSPECIFIED: ICD-10-CM

## 2025-01-29 DIAGNOSIS — I95.1 ORTHOSTATIC HYPOTENSION: ICD-10-CM

## 2025-01-29 DIAGNOSIS — Z79.899 OTHER LONG TERM (CURRENT) DRUG THERAPY: ICD-10-CM

## 2025-01-29 DIAGNOSIS — I25.10 ATHEROSCLEROTIC HEART DISEASE OF NATIVE CORONARY ARTERY W/OUT ANGINA PECTORIS: ICD-10-CM

## 2025-01-29 DIAGNOSIS — I48.19 OTHER PERSISTENT ATRIAL FIBRILLATION: ICD-10-CM

## 2025-01-29 PROCEDURE — G2211 COMPLEX E/M VISIT ADD ON: CPT

## 2025-01-29 PROCEDURE — 99215 OFFICE O/P EST HI 40 MIN: CPT

## 2025-01-29 PROCEDURE — 93040 RHYTHM ECG WITH REPORT: CPT | Mod: 59

## 2025-01-29 PROCEDURE — G2212 PROLONG OUTPT/OFFICE VIS: CPT

## 2025-01-29 PROCEDURE — 93000 ELECTROCARDIOGRAM COMPLETE: CPT

## 2025-01-30 LAB
ALBUMIN SERPL ELPH-MCNC: 4.4 G/DL
ALP BLD-CCNC: 94 U/L
ALT SERPL-CCNC: 15 U/L
ANION GAP SERPL CALC-SCNC: 13 MMOL/L
APPEARANCE: CLEAR
AST SERPL-CCNC: 20 U/L
BACTERIA: NEGATIVE /HPF
BILIRUB SERPL-MCNC: 1.4 MG/DL
BILIRUBIN URINE: ABNORMAL
BLOOD URINE: NEGATIVE
BUN SERPL-MCNC: 30 MG/DL
CALCIUM SERPL-MCNC: 9.7 MG/DL
CAST: 0 /LPF
CHLORIDE SERPL-SCNC: 101 MMOL/L
CHOLEST SERPL-MCNC: 160 MG/DL
CK SERPL-CCNC: 137 U/L
CO2 SERPL-SCNC: 28 MMOL/L
COLOR: NORMAL
CREAT SERPL-MCNC: 0.91 MG/DL
EGFR: 85 ML/MIN/1.73M2
EPITHELIAL CELLS: 0 /HPF
ESTIMATED AVERAGE GLUCOSE: 108 MG/DL
GLUCOSE QUALITATIVE U: NEGATIVE MG/DL
GLUCOSE SERPL-MCNC: 59 MG/DL
HBA1C MFR BLD HPLC: 5.4 %
HCT VFR BLD CALC: 47.1 %
HDLC SERPL-MCNC: 46 MG/DL
HGB BLD-MCNC: 15.7 G/DL
KETONES URINE: NEGATIVE MG/DL
LDLC SERPL CALC-MCNC: 105 MG/DL
LDLC SERPL DIRECT ASSAY-MCNC: 112 MG/DL
LEUKOCYTE ESTERASE URINE: ABNORMAL
MAGNESIUM SERPL-MCNC: 2.3 MG/DL
MCHC RBC-ENTMCNC: 30.5 PG
MCHC RBC-ENTMCNC: 33.3 G/DL
MCV RBC AUTO: 91.5 FL
MICROSCOPIC-UA: NORMAL
NITRITE URINE: NEGATIVE
NONHDLC SERPL-MCNC: 115 MG/DL
NT-PROBNP SERPL-MCNC: 1464 PG/ML
PH URINE: 6
PLATELET # BLD AUTO: 140 K/UL
POTASSIUM SERPL-SCNC: 4.7 MMOL/L
PROT SERPL-MCNC: 7.4 G/DL
PROTEIN URINE: NEGATIVE MG/DL
RBC # BLD: 5.15 M/UL
RBC # FLD: 13.5 %
RED BLOOD CELLS URINE: 2 /HPF
SODIUM SERPL-SCNC: 142 MMOL/L
SPECIFIC GRAVITY URINE: 1.02
T4 FREE SERPL-MCNC: 1.4 NG/DL
TRIGL SERPL-MCNC: 49 MG/DL
TSH SERPL-ACNC: 1.96 UIU/ML
UROBILINOGEN URINE: 1 MG/DL
WBC # FLD AUTO: 4.62 K/UL
WHITE BLOOD CELLS URINE: 0 /HPF

## 2025-02-12 NOTE — H&P PST ADULT - NS SC CAGE ALCOHOL ANNOYED YOU
[Normal] : normal hearing, normal lips.gums, normal oropharynx no [Normal Appearance] : the appearance of the neck was normal [No Respiratory Distress] : no respiratory distress [No Acc Muscle Use] : no accessory muscle use [Respiration, Rhythm And Depth] : normal respiratory rhythm and effort [Auscultation Breath Sounds / Voice Sounds] : lungs were clear to auscultation bilaterally [Heart Rate And Rhythm] : heart rate was normal and rhythm regular [Bowel Sounds] : normal bowel sounds [Abdomen Tenderness] : non-tender [Abdomen Soft] : soft [Oriented To Time, Place, And Person] : oriented to person, place, and time

## 2025-02-14 ENCOUNTER — APPOINTMENT (OUTPATIENT)
Dept: ELECTROPHYSIOLOGY | Facility: CLINIC | Age: 80
End: 2025-02-14

## 2025-02-14 PROCEDURE — 93296 REM INTERROG EVL PM/IDS: CPT

## 2025-02-14 PROCEDURE — 93294 REM INTERROG EVL PM/LDLS PM: CPT

## 2025-03-17 ENCOUNTER — RESULT REVIEW (OUTPATIENT)
Age: 80
End: 2025-03-17

## 2025-03-17 ENCOUNTER — APPOINTMENT (OUTPATIENT)
Dept: CV DIAGNOSITCS | Facility: HOSPITAL | Age: 80
End: 2025-03-17

## 2025-03-17 ENCOUNTER — OUTPATIENT (OUTPATIENT)
Dept: OUTPATIENT SERVICES | Facility: HOSPITAL | Age: 80
LOS: 1 days | End: 2025-03-17
Payer: MEDICARE

## 2025-03-17 DIAGNOSIS — I25.5 ISCHEMIC CARDIOMYOPATHY: ICD-10-CM

## 2025-03-17 DIAGNOSIS — Z71.3 DIETARY COUNSELING AND SURVEILLANCE: ICD-10-CM

## 2025-03-17 DIAGNOSIS — I25.10 ATHEROSCLEROTIC HEART DISEASE OF NATIVE CORONARY ARTERY WITHOUT ANGINA PECTORIS: ICD-10-CM

## 2025-03-17 DIAGNOSIS — Z85.038 PERSONAL HISTORY OF OTHER MALIGNANT NEOPLASM OF LARGE INTESTINE: Chronic | ICD-10-CM

## 2025-03-17 DIAGNOSIS — Z95.0 PRESENCE OF CARDIAC PACEMAKER: Chronic | ICD-10-CM

## 2025-03-17 DIAGNOSIS — I10 ESSENTIAL (PRIMARY) HYPERTENSION: ICD-10-CM

## 2025-03-17 DIAGNOSIS — E78.5 HYPERLIPIDEMIA, UNSPECIFIED: ICD-10-CM

## 2025-03-17 DIAGNOSIS — D75.1 SECONDARY POLYCYTHEMIA: ICD-10-CM

## 2025-03-17 PROCEDURE — 93306 TTE W/DOPPLER COMPLETE: CPT | Mod: 26

## 2025-03-20 ENCOUNTER — NON-APPOINTMENT (OUTPATIENT)
Age: 80
End: 2025-03-20

## 2025-05-16 ENCOUNTER — APPOINTMENT (OUTPATIENT)
Dept: ELECTROPHYSIOLOGY | Facility: CLINIC | Age: 80
End: 2025-05-16
Payer: MEDICARE

## 2025-05-16 ENCOUNTER — NON-APPOINTMENT (OUTPATIENT)
Age: 80
End: 2025-05-16

## 2025-05-16 PROCEDURE — 93294 REM INTERROG EVL PM/LDLS PM: CPT

## 2025-05-16 PROCEDURE — 93296 REM INTERROG EVL PM/IDS: CPT

## 2025-06-08 ENCOUNTER — OUTPATIENT (OUTPATIENT)
Dept: OUTPATIENT SERVICES | Facility: HOSPITAL | Age: 80
LOS: 1 days | End: 2025-06-08
Payer: MEDICARE

## 2025-06-08 DIAGNOSIS — Z85.038 PERSONAL HISTORY OF OTHER MALIGNANT NEOPLASM OF LARGE INTESTINE: Chronic | ICD-10-CM

## 2025-06-08 DIAGNOSIS — Z95.0 PRESENCE OF CARDIAC PACEMAKER: Chronic | ICD-10-CM

## 2025-06-09 ENCOUNTER — TRANSCRIPTION ENCOUNTER (OUTPATIENT)
Age: 80
End: 2025-06-09

## 2025-06-09 PROCEDURE — 36415 COLL VENOUS BLD VENIPUNCTURE: CPT

## 2025-06-09 PROCEDURE — 88300 SURGICAL PATH GROSS: CPT

## 2025-06-09 PROCEDURE — 86901 BLOOD TYPING SEROLOGIC RH(D): CPT

## 2025-06-09 PROCEDURE — 85730 THROMBOPLASTIN TIME PARTIAL: CPT

## 2025-06-09 PROCEDURE — 86900 BLOOD TYPING SEROLOGIC ABO: CPT

## 2025-06-09 PROCEDURE — 85610 PROTHROMBIN TIME: CPT

## 2025-06-09 PROCEDURE — 93005 ELECTROCARDIOGRAM TRACING: CPT

## 2025-06-09 PROCEDURE — 85027 COMPLETE CBC AUTOMATED: CPT

## 2025-06-09 PROCEDURE — 86850 RBC ANTIBODY SCREEN: CPT

## 2025-06-09 PROCEDURE — 80048 BASIC METABOLIC PNL TOTAL CA: CPT

## 2025-06-09 PROCEDURE — 54406 REMOVE MUTI-COMP PENIS PROS: CPT

## 2025-06-11 DIAGNOSIS — T83.9XXA UNSPECIFIED COMPLICATION OF GENITOURINARY PROSTHETIC DEVICE, IMPLANT AND GRAFT, INITIAL ENCOUNTER: ICD-10-CM

## 2025-08-06 ENCOUNTER — NON-APPOINTMENT (OUTPATIENT)
Age: 80
End: 2025-08-06

## 2025-08-06 ENCOUNTER — APPOINTMENT (OUTPATIENT)
Dept: CARDIOLOGY | Facility: CLINIC | Age: 80
End: 2025-08-06
Payer: MEDICARE

## 2025-08-06 VITALS — HEART RATE: 73 BPM | SYSTOLIC BLOOD PRESSURE: 125 MMHG | DIASTOLIC BLOOD PRESSURE: 66 MMHG | OXYGEN SATURATION: 98 %

## 2025-08-06 VITALS
RESPIRATION RATE: 16 BRPM | WEIGHT: 167 LBS | SYSTOLIC BLOOD PRESSURE: 116 MMHG | BODY MASS INDEX: 26.21 KG/M2 | HEIGHT: 67 IN | DIASTOLIC BLOOD PRESSURE: 70 MMHG | OXYGEN SATURATION: 99 % | TEMPERATURE: 98.1 F | HEART RATE: 74 BPM

## 2025-08-06 VITALS — HEART RATE: 72 BPM | SYSTOLIC BLOOD PRESSURE: 123 MMHG | DIASTOLIC BLOOD PRESSURE: 65 MMHG | OXYGEN SATURATION: 97 %

## 2025-08-06 VITALS — OXYGEN SATURATION: 97 % | HEART RATE: 75 BPM | DIASTOLIC BLOOD PRESSURE: 65 MMHG | SYSTOLIC BLOOD PRESSURE: 122 MMHG

## 2025-08-06 VITALS — HEART RATE: 74 BPM | OXYGEN SATURATION: 98 % | SYSTOLIC BLOOD PRESSURE: 115 MMHG | DIASTOLIC BLOOD PRESSURE: 68 MMHG

## 2025-08-06 DIAGNOSIS — Z98.890 OTHER SPECIFIED POSTPROCEDURAL STATES: ICD-10-CM

## 2025-08-06 DIAGNOSIS — M19.90 UNSPECIFIED OSTEOARTHRITIS, UNSPECIFIED SITE: ICD-10-CM

## 2025-08-06 DIAGNOSIS — Z71.3 DIETARY COUNSELING AND SURVEILLANCE: ICD-10-CM

## 2025-08-06 DIAGNOSIS — Z95.0 PRESENCE OF CARDIAC PACEMAKER: ICD-10-CM

## 2025-08-06 DIAGNOSIS — E78.5 HYPERLIPIDEMIA, UNSPECIFIED: ICD-10-CM

## 2025-08-06 DIAGNOSIS — I48.19 OTHER PERSISTENT ATRIAL FIBRILLATION: ICD-10-CM

## 2025-08-06 DIAGNOSIS — I10 ESSENTIAL (PRIMARY) HYPERTENSION: ICD-10-CM

## 2025-08-06 DIAGNOSIS — Z95.818 OTHER SPECIFIED POSTPROCEDURAL STATES: ICD-10-CM

## 2025-08-06 DIAGNOSIS — I25.10 ATHEROSCLEROTIC HEART DISEASE OF NATIVE CORONARY ARTERY W/OUT ANGINA PECTORIS: ICD-10-CM

## 2025-08-06 DIAGNOSIS — I50.30 UNSPECIFIED DIASTOLIC (CONGESTIVE) HEART FAILURE: ICD-10-CM

## 2025-08-06 DIAGNOSIS — I95.1 ORTHOSTATIC HYPOTENSION: ICD-10-CM

## 2025-08-06 PROCEDURE — G2212 PROLONG OUTPT/OFFICE VIS: CPT

## 2025-08-06 PROCEDURE — 93040 RHYTHM ECG WITH REPORT: CPT | Mod: 59

## 2025-08-06 PROCEDURE — 93000 ELECTROCARDIOGRAM COMPLETE: CPT

## 2025-08-06 PROCEDURE — 99215 OFFICE O/P EST HI 40 MIN: CPT

## 2025-08-06 RX ORDER — UMECLIDINIUM 62.5 UG/1
62.5 AEROSOL, POWDER ORAL DAILY
Qty: 1 | Refills: 0 | Status: ACTIVE | COMMUNITY
Start: 2025-08-06

## 2025-08-06 RX ORDER — MELATONIN 5 MG
5 CAPSULE ORAL
Qty: 90 | Refills: 3 | Status: ACTIVE | COMMUNITY
Start: 2025-08-06

## 2025-08-06 RX ORDER — ALBUTEROL SULFATE 90 UG/1
108 (90 BASE) INHALANT RESPIRATORY (INHALATION)
Qty: 1 | Refills: 3 | Status: ACTIVE | COMMUNITY
Start: 2025-08-06 | End: 1900-01-01

## 2025-08-06 RX ORDER — ROSUVASTATIN CALCIUM 10 MG/1
10 TABLET, FILM COATED ORAL
Qty: 90 | Refills: 3 | Status: ACTIVE | COMMUNITY
Start: 2025-08-06

## 2025-08-06 RX ORDER — MELOXICAM 15 MG/1
15 TABLET ORAL
Qty: 90 | Refills: 3 | Status: ACTIVE | COMMUNITY
Start: 2025-08-06

## 2025-08-06 RX ORDER — DOCUSATE SODIUM 100 MG/1
100 CAPSULE, LIQUID FILLED ORAL TWICE DAILY
Qty: 180 | Refills: 3 | Status: ACTIVE | COMMUNITY
Start: 2025-08-06

## 2025-08-12 ENCOUNTER — APPOINTMENT (OUTPATIENT)
Dept: ELECTROPHYSIOLOGY | Facility: CLINIC | Age: 80
End: 2025-08-12
Payer: MEDICARE

## 2025-08-12 ENCOUNTER — NON-APPOINTMENT (OUTPATIENT)
Age: 80
End: 2025-08-12

## 2025-08-12 VITALS — HEART RATE: 72 BPM | SYSTOLIC BLOOD PRESSURE: 118 MMHG | DIASTOLIC BLOOD PRESSURE: 70 MMHG

## 2025-08-12 DIAGNOSIS — I49.5 SICK SINUS SYNDROME: ICD-10-CM

## 2025-08-12 PROCEDURE — 93279 PRGRMG DEV EVAL PM/LDLS PM: CPT
